# Patient Record
Sex: FEMALE | Race: WHITE | Employment: FULL TIME | URBAN - METROPOLITAN AREA
[De-identification: names, ages, dates, MRNs, and addresses within clinical notes are randomized per-mention and may not be internally consistent; named-entity substitution may affect disease eponyms.]

---

## 2022-04-20 ENCOUNTER — HOSPITAL ENCOUNTER (EMERGENCY)
Age: 27
Discharge: HOME | End: 2022-04-20
Attending: EMERGENCY MEDICINE
Payer: COMMERCIAL

## 2022-04-20 VITALS
RESPIRATION RATE: 20 BRPM | HEART RATE: 121 BPM | OXYGEN SATURATION: 99 % | TEMPERATURE: 97.6 F | SYSTOLIC BLOOD PRESSURE: 117 MMHG | DIASTOLIC BLOOD PRESSURE: 77 MMHG

## 2022-04-20 DIAGNOSIS — O21.0 MILD HYPEREMESIS GRAVIDARUM, ANTEPARTUM: Primary | ICD-10-CM

## 2022-04-20 DIAGNOSIS — E86.0 DEHYDRATION: ICD-10-CM

## 2022-04-20 LAB
ALBUMIN SERPL-MCNC: 3.9 G/DL (ref 3.5–5.2)
ALP BLD-CCNC: 90 U/L (ref 35–104)
ALT SERPL-CCNC: 20 U/L (ref 0–32)
ANION GAP SERPL CALCULATED.3IONS-SCNC: 15 MMOL/L (ref 7–16)
AST SERPL-CCNC: 15 U/L (ref 0–31)
BASOPHILS ABSOLUTE: 0.03 E9/L (ref 0–0.2)
BASOPHILS RELATIVE PERCENT: 0.3 % (ref 0–2)
BILIRUB SERPL-MCNC: 0.5 MG/DL (ref 0–1.2)
BUN BLDV-MCNC: 9 MG/DL (ref 6–20)
CALCIUM SERPL-MCNC: 9.1 MG/DL (ref 8.6–10.2)
CHLORIDE BLD-SCNC: 101 MMOL/L (ref 98–107)
CO2: 20 MMOL/L (ref 22–29)
CREAT SERPL-MCNC: 0.5 MG/DL (ref 0.5–1)
EOSINOPHILS ABSOLUTE: 0.1 E9/L (ref 0.05–0.5)
EOSINOPHILS RELATIVE PERCENT: 0.9 % (ref 0–6)
GFR AFRICAN AMERICAN: >60
GFR NON-AFRICAN AMERICAN: >60 ML/MIN/1.73
GLUCOSE BLD-MCNC: 90 MG/DL (ref 74–99)
HCT VFR BLD CALC: 40.1 % (ref 34–48)
HEMOGLOBIN: 13.5 G/DL (ref 11.5–15.5)
IMMATURE GRANULOCYTES #: 0.05 E9/L
IMMATURE GRANULOCYTES %: 0.5 % (ref 0–5)
LACTIC ACID: 1.2 MMOL/L (ref 0.5–2.2)
LIPASE: 17 U/L (ref 13–60)
LYMPHOCYTES ABSOLUTE: 1.7 E9/L (ref 1.5–4)
LYMPHOCYTES RELATIVE PERCENT: 15.3 % (ref 20–42)
MCH RBC QN AUTO: 30.8 PG (ref 26–35)
MCHC RBC AUTO-ENTMCNC: 33.7 % (ref 32–34.5)
MCV RBC AUTO: 91.6 FL (ref 80–99.9)
MONOCYTES ABSOLUTE: 0.59 E9/L (ref 0.1–0.95)
MONOCYTES RELATIVE PERCENT: 5.3 % (ref 2–12)
NEUTROPHILS ABSOLUTE: 8.61 E9/L (ref 1.8–7.3)
NEUTROPHILS RELATIVE PERCENT: 77.7 % (ref 43–80)
PDW BLD-RTO: 13.2 FL (ref 11.5–15)
PLATELET # BLD: 277 E9/L (ref 130–450)
PMV BLD AUTO: 10 FL (ref 7–12)
POTASSIUM REFLEX MAGNESIUM: 4 MMOL/L (ref 3.5–5)
RBC # BLD: 4.38 E12/L (ref 3.5–5.5)
SODIUM BLD-SCNC: 136 MMOL/L (ref 132–146)
TOTAL PROTEIN: 7.5 G/DL (ref 6.4–8.3)
WBC # BLD: 11.1 E9/L (ref 4.5–11.5)

## 2022-04-20 PROCEDURE — 99284 EMERGENCY DEPT VISIT MOD MDM: CPT

## 2022-04-20 PROCEDURE — 96374 THER/PROPH/DIAG INJ IV PUSH: CPT

## 2022-04-20 PROCEDURE — 6360000002 HC RX W HCPCS: Performed by: EMERGENCY MEDICINE

## 2022-04-20 PROCEDURE — 96361 HYDRATE IV INFUSION ADD-ON: CPT

## 2022-04-20 PROCEDURE — 85025 COMPLETE CBC W/AUTO DIFF WBC: CPT

## 2022-04-20 PROCEDURE — 2580000003 HC RX 258: Performed by: EMERGENCY MEDICINE

## 2022-04-20 PROCEDURE — 83605 ASSAY OF LACTIC ACID: CPT

## 2022-04-20 PROCEDURE — 80053 COMPREHEN METABOLIC PANEL: CPT

## 2022-04-20 PROCEDURE — 83690 ASSAY OF LIPASE: CPT

## 2022-04-20 RX ORDER — 0.9 % SODIUM CHLORIDE 0.9 %
1000 INTRAVENOUS SOLUTION INTRAVENOUS ONCE
Status: COMPLETED | OUTPATIENT
Start: 2022-04-20 | End: 2022-04-20

## 2022-04-20 RX ORDER — METOCLOPRAMIDE HYDROCHLORIDE 5 MG/ML
10 INJECTION INTRAMUSCULAR; INTRAVENOUS ONCE
Status: COMPLETED | OUTPATIENT
Start: 2022-04-20 | End: 2022-04-20

## 2022-04-20 RX ADMIN — SODIUM CHLORIDE 1000 ML: 9 INJECTION, SOLUTION INTRAVENOUS at 13:06

## 2022-04-20 RX ADMIN — METOCLOPRAMIDE 10 MG: 5 INJECTION, SOLUTION INTRAMUSCULAR; INTRAVENOUS at 13:07

## 2022-04-20 ASSESSMENT — LIFESTYLE VARIABLES: HOW OFTEN DO YOU HAVE A DRINK CONTAINING ALCOHOL: NEVER

## 2022-04-21 NOTE — ED PROVIDER NOTES
Chief Complaint   Patient presents with    Emesis     throughout 15 week pregnancy but worse for past 24 hours, 5lb weight loss       HPI  Filemon Betancur is a 27 y.o.  17 week pregnant female who presents from her OB's office with concerns for hyperemesis and dehydration. The complaints are persistent, moderate in severity, worsened by nothing and improved by nothing. The patient states she has had fairly severe nausea and vomiting almost since her pregnancy began. Over the last several days her vomiting has increased significantly. She estimates approximately 15 episodes of NBNB emesis in the last 24 hours and says she has lost approximately 5 lbs in the last weeks. She states that she has not taken anything for her symptoms at home, but says her OB gave her a prescription for Zofran today. The patient denies recent trauma, fever, chills, fatigue, HA, dizziness, lightheadedness, paresthesias, generalized weakness, confusion, forgetfulness, vision changes, eye redness, congestion, rhinorrhea, sore throat, neck pain, C/T/L pain, chest pain, palpitations, LE edema, SOB, cough, wheezing, abdominal pain, diarrhea, constipation, hematochezia, melena, dysuria, hematuria, flank pain, decreased UOP, myalgias, arthralgias, ROM issues, swelling, rashes and erythema. ROS is otherwise negative. The patient is currently taking no blood thinners. Tobacco Hx:   reports that she has been smoking. She has been smoking about 0.25 packs per day. She does not have any smokeless tobacco history on file. Alcohol Hx:   has no history on file for alcohol use. Illicit Drug Hx:   has no history on file for drug use. The history is provided by the patient. Last Tetanus (if applicable): n/a    Review of Systems:   A complete review of systems was performed and pertinent positives and negatives are stated within the HPI, all other systems reviewed and are negative.     Physical Exam:  GEN: Cooperative, well-developed, well-nourished adult in NAD; pt appears stated age  Head: Normocephalic and atraumatic; no tenderness to palpation anywhere  Eyes: PERRL, EOMI, conjunctiva clear, cornea without gross abnormality, no visual deficits noted b/l  Ears: External ear normal-appearing and non-tender b/l; no hearing deficit noted  Nose: Nares patent and free of debris; septum midline; mucosa appears normal; no drainage noted  Throat: Oral mucosa dry with no lesions noted; dentition wnl; no posterior pharyngeal erythema or edema; tonsils are not swollen and there is no exudate noted; no post-nasal drip  Neck: Supple and symmetrical with midline trachea; no cervical or supraclavicular lymphadenopathy noted; thyroid not palpated, but no tenderness or masses noted in the region; normal ROM with no meningeal signs  Lungs: LCTAB with no wheezes, rhonchi or rales noted; no respiratory distress, breathing unlabored   CV: RRR, no murmurs, gallops or rubs noted on auscultation, normal S1/S2; UE and LE distal pulses intact b/l +2/4 with no cyanosis noted; no LE edema noted b/l; capillary refill 2-3 seconds  ABD: Soft, non-tender, gravid, no organomegaly, hernias or masses noted  /Rectal: no suprapubic tenderness; remainder of exam deferred  MSK: UEs and LEs without notable trauma, ROM restriction or tenderness to palpation b/l; normal strength throughout  Skin: Skin warm and dry without notable rash, erythema, bruising or lesion; normal turgor  Neuro: A&O x3; CN II-XII appear grossly intact; no focal deficits appreciated; pt thoughtful in discussion and able to answer all questions without issue  Psych: normal attention with no obvious AVH, normal mood, normal affect    ---------------------------------- PAST HISTORY ---------------------------------------------  Past Medical History:  has no past medical history on file. Past Surgical History:  has no past surgical history on file. Social History:  reports that she has been smoking.  She has been smoking about 0.25 packs per day. She does not have any smokeless tobacco history on file. Family History: family history is not on file. Home Meds: Not in a hospital admission. The patients home medications have been reviewed. Allergies: Patient has no known allergies. ------------------------- NURSING NOTES AND VITALS REVIEWED ---------------------------  Date / Time Roomed:  4/20/2022 12:09 PM  ED Bed Assignment:  SEAN/SEAN    The nursing notes within the ED encounter and vital signs as below have been reviewed. /77   Pulse 121   Temp 97.6 °F (36.4 °C) (Infrared)   Resp 20   SpO2 99%   -------------------------------------------------- RESULTS / INTERVENTIONS -------------------------------------------------  All laboratory and radiology tests have been reviewed by this physician.     LABS:  Results for orders placed or performed during the hospital encounter of 04/20/22   CBC with Auto Differential   Result Value Ref Range    WBC 11.1 4.5 - 11.5 E9/L    RBC 4.38 3.50 - 5.50 E12/L    Hemoglobin 13.5 11.5 - 15.5 g/dL    Hematocrit 40.1 34.0 - 48.0 %    MCV 91.6 80.0 - 99.9 fL    MCH 30.8 26.0 - 35.0 pg    MCHC 33.7 32.0 - 34.5 %    RDW 13.2 11.5 - 15.0 fL    Platelets 457 317 - 491 E9/L    MPV 10.0 7.0 - 12.0 fL    Neutrophils % 77.7 43.0 - 80.0 %    Immature Granulocytes % 0.5 0.0 - 5.0 %    Lymphocytes % 15.3 (L) 20.0 - 42.0 %    Monocytes % 5.3 2.0 - 12.0 %    Eosinophils % 0.9 0.0 - 6.0 %    Basophils % 0.3 0.0 - 2.0 %    Neutrophils Absolute 8.61 (H) 1.80 - 7.30 E9/L    Immature Granulocytes # 0.05 E9/L    Lymphocytes Absolute 1.70 1.50 - 4.00 E9/L    Monocytes Absolute 0.59 0.10 - 0.95 E9/L    Eosinophils Absolute 0.10 0.05 - 0.50 E9/L    Basophils Absolute 0.03 0.00 - 0.20 E9/L   Comprehensive Metabolic Panel w/ Reflex to MG   Result Value Ref Range    Sodium 136 132 - 146 mmol/L    Potassium reflex Magnesium 4.0 3.5 - 5.0 mmol/L    Chloride 101 98 - 107 mmol/L    CO2 20 (L) 22 - 29 mmol/L    Anion Gap 15 7 - 16 mmol/L    Glucose 90 74 - 99 mg/dL    BUN 9 6 - 20 mg/dL    CREATININE 0.5 0.5 - 1.0 mg/dL    GFR Non-African American >60 >=60 mL/min/1.73    GFR African American >60     Calcium 9.1 8.6 - 10.2 mg/dL    Total Protein 7.5 6.4 - 8.3 g/dL    Albumin 3.9 3.5 - 5.2 g/dL    Total Bilirubin 0.5 0.0 - 1.2 mg/dL    Alkaline Phosphatase 90 35 - 104 U/L    ALT 20 0 - 32 U/L    AST 15 0 - 31 U/L   Lipase   Result Value Ref Range    Lipase 17 13 - 60 U/L   Lactic Acid   Result Value Ref Range    Lactic Acid 1.2 0.5 - 2.2 mmol/L       RADIOLOGY: Interpreted by Radiologist unless otherwise noted. No orders to display       Oxygen Saturation Interpretation: normal    Meds Given:  Medications   0.9 % sodium chloride bolus (0 mLs IntraVENous Stopped 4/20/22 1559)   metoclopramide (REGLAN) injection 10 mg (10 mg IntraVENous Given 4/20/22 1307)       Procedures:  No procedures performed. --------------------------------- PROGRESS NOTES / ADDITIONAL PROVIDER NOTES ---------------------------------  Consultations:  As outlined below. ED Course:  ED Course as of 04/23/22 1001   Wed Apr 20, 2022   1227 ATTENDING PROVIDER ATTESTATION:     I have personally performed and/or participated in the history, exam, medical decision making, and procedures and agree with all pertinent clinical information unless otherwise noted. I have also reviewed and agree with the past medical, family and social history unless otherwise noted. I have discussed this patient in detail with the resident, and provided the instruction and education regarding here stating she is about 15 weeks pregnant and is having several days of persistent nausea and vomiting with no diarrhea. No flank pain. No fevers. No actual abdominal pain. No particular dysuria. My findings/plan: Patient sitting the bed resting comfortably no distress. Heart rate mildly tachycardic. Dry lips and oral mucosa.   Abdomen soft and nontender with no CVA tenderness. No jaundice or icterus. Arms legs are neurovascular intact. No pretibial edema or calf pain. [NC]   2935 On reevaluation, the patient is resting comfortably in bed. She states that she is feeling significantly better following the dose of Reglan. Her IV fluid is approximately shelter in. I told her she is still feeling well after she completes the liter, we will discharge her home if that is what she prefers. She states that that is what she is hoping for. We will continue to monitor. [ML]   6018 Case discussed with Dr. Joe Robles on patient arrival, she knows this patient well and well centered initially for possible admission, patient able to be seen and hydrated in ER, she like the patient evaluated, hydrated and see how she does clinically and then called back. [NC]   1228 Patient laying the bed resting comfortably in no distress. She states she is feeling much better. She has had no vomiting in the ER. Her exam is unchanged. She is finishing up a liter of fluid and states she is ready to go home. Electrolytes and work-up results have been discussed with her. [NC]   1420 Case discussed with Dr. Joe Robles, detailed overview given, she will follow the patient on an outpatient basis. [NC]   8673 Patient had a urinalysis ordered, patient has received IV fluids and has had no vomiting in the ER however she does not feel she has to pee right now and is not going to wait to give us a sample and states she is ready to leave and is also refusing a straight cath. [NC]      ED Course User Index  [ML] Irasema Parker DO  [NC] 1150 [facility], DO       8196: All results were discussed with the patient and I have provided specific details regarding the plan of care, diagnosis and associated prognosis.  The patient tolerated the visit well and, at the time of discharge, was without objective evidence of hemodynamic instability or an acute process requiring hospitalization and inpatient management. The patient was seen by myself and the assigned attending physician, Dr. Jannie Serrano, who agreed with my assessment and plan as laid out herein. The importance of follow-up was discussed at the end of the visit and I recommended that the patient be seen by their primary care physician in 2-3 days. The patient verbalized their understanding and agreement with the plan as presented and stated their intention to follow up. Reasons to return to the ER or seek immediate evaluation by a medical provider were discussed at length and all questions were answered to the highest degree of accuracy possible based on the current information available. At this time the patient is stable and safe for discharge. MDM:  Patient presented with concerns for hyperemesis and dehydration. On arrival, the patient was tachycardic with remaining VS wnl. physical exam was as documented above. Labs were remarkable for no leukocytosis, normal lactate, normal lipase and no electrolyte abnormalities. She was unable to provide a urine sample and refused catheterization. She was made aware of the importance of the test, but said she would not be able to provide one and wanted to forgo it. She was given a dose of reglan and a bolus of NSIVF and reported significant symptomatic improvement. Following that, she repeatedly stated her desire to go home, though on arrival she said she was sent in by her OB to be admitted and was initially fine with that plan. Given that, her clinical improvement and the lack of findings necessitating further emergent intervention or hospitalization, the decision was made to discharge the patient with recommendations for follow up and symptomatic care. Reasons to return to the ER were discussed and all questions were answered. The patient was stable at the time of their disposition. Discharge Medication List as of 4/20/2022  4:13 PM          Diagnosis:  1.  Mild hyperemesis gravidarum, antepartum 2. Dehydration        Disposition:  Patient's disposition: Discharge to home. Patient's condition is stable. This patient was seen, examined and treated with Dr. Claudia Almonte. All pertinent aspects of the patient's care were discussed with the attending physician.        Mary Simon DO  Resident  04/23/22 1685

## 2022-07-01 ENCOUNTER — HOSPITAL ENCOUNTER (OUTPATIENT)
Age: 27
Discharge: HOME | End: 2022-07-01
Attending: LEGAL MEDICINE | Admitting: LEGAL MEDICINE
Payer: COMMERCIAL

## 2022-07-01 PROCEDURE — 6360000002 HC RX W HCPCS: Performed by: LEGAL MEDICINE

## 2022-07-01 PROCEDURE — 96372 THER/PROPH/DIAG INJ SC/IM: CPT

## 2022-07-01 RX ORDER — BETAMETHASONE SODIUM PHOSPHATE AND BETAMETHASONE ACETATE 3; 3 MG/ML; MG/ML
12 INJECTION, SUSPENSION INTRA-ARTICULAR; INTRALESIONAL; INTRAMUSCULAR; SOFT TISSUE ONCE
Status: COMPLETED | OUTPATIENT
Start: 2022-07-01 | End: 2022-07-01

## 2022-07-01 RX ADMIN — BETAMETHASONE SODIUM PHOSPHATE AND BETAMETHASONE ACETATE 12 MG: 3; 3 INJECTION, SUSPENSION INTRA-ARTICULAR; INTRALESIONAL; INTRAMUSCULAR at 13:43

## 2022-07-01 NOTE — PROGRESS NOTES
Injection completed and patient watched prior to discharge.   Patient discharged ambulatory with no complaints

## 2022-07-02 ENCOUNTER — HOSPITAL ENCOUNTER (OUTPATIENT)
Age: 27
Discharge: HOME | End: 2022-07-02
Attending: LEGAL MEDICINE | Admitting: LEGAL MEDICINE
Payer: COMMERCIAL

## 2022-07-02 ENCOUNTER — APPOINTMENT (OUTPATIENT)
Dept: LABOR AND DELIVERY | Age: 27
End: 2022-07-02
Payer: COMMERCIAL

## 2022-07-02 VITALS
SYSTOLIC BLOOD PRESSURE: 112 MMHG | HEART RATE: 103 BPM | DIASTOLIC BLOOD PRESSURE: 70 MMHG | TEMPERATURE: 97.9 F | RESPIRATION RATE: 14 BRPM

## 2022-07-02 PROCEDURE — 6360000002 HC RX W HCPCS: Performed by: LEGAL MEDICINE

## 2022-07-02 PROCEDURE — 59025 FETAL NON-STRESS TEST: CPT

## 2022-07-02 RX ORDER — BETAMETHASONE SODIUM PHOSPHATE AND BETAMETHASONE ACETATE 3; 3 MG/ML; MG/ML
12.5 INJECTION, SUSPENSION INTRA-ARTICULAR; INTRALESIONAL; INTRAMUSCULAR; SOFT TISSUE ONCE
Status: COMPLETED | OUTPATIENT
Start: 2022-07-02 | End: 2022-07-02

## 2022-07-02 RX ADMIN — BETAMETHASONE SODIUM PHOSPHATE AND BETAMETHASONE ACETATE 12.5 MG: 3; 3 INJECTION, SUSPENSION INTRA-ARTICULAR; INTRALESIONAL; INTRAMUSCULAR at 13:38

## 2022-07-02 NOTE — PROGRESS NOTES
Patient discharged in stable condition.  NST tracing faxed to Dr. Yeny Arango office per her request.

## 2022-07-02 NOTE — PROGRESS NOTES
26w1d  EDC 10/7/22 ambulatory to L&D for NST and second dose of betamethasone. EFM applied. Maternal perception of fetal movement noted. Patient denies any bleeding or leaking of fluid. Does report occasional abdominal discomfort that has been present for a few days now. Call light in reach.

## 2022-07-02 NOTE — PROGRESS NOTES
Dr. Uzair Mejia notified of reactive NST, blood pressure reading, and administration of second dose of betamethasone. Aware patient currently offers no complaints. Call transferred to patient's room for Dr. Uzair Mejia to speak with patient. Patient may be discharged per Dr. Uzair Mejia.

## 2022-08-04 ENCOUNTER — HOSPITAL ENCOUNTER (OUTPATIENT)
Age: 27
Discharge: HOME | End: 2022-08-04
Attending: LEGAL MEDICINE | Admitting: LEGAL MEDICINE
Payer: COMMERCIAL

## 2022-08-04 VITALS
TEMPERATURE: 98.1 F | DIASTOLIC BLOOD PRESSURE: 69 MMHG | RESPIRATION RATE: 16 BRPM | HEART RATE: 100 BPM | SYSTOLIC BLOOD PRESSURE: 110 MMHG

## 2022-08-04 PROBLEM — Z34.93 NORMAL PREGNANCY, THIRD TRIMESTER: Status: ACTIVE | Noted: 2022-08-04

## 2022-08-04 PROCEDURE — 99202 OFFICE O/P NEW SF 15 MIN: CPT

## 2022-08-04 NOTE — PROGRESS NOTES
Dr. Alfonzo Matt called in and FFN was negative. Patient may be discharged to home. No co's offered. Home instructions given, patient verbalizes understanding. Discharged ambulatory to unit door.

## 2022-08-04 NOTE — DISCHARGE INSTRUCTIONS
Home Undelivered Discharge Instructions    After Discharge Orders: Follow up as scheduled                  Diet:  normal diet as tolerated    Rest: normal activity as tolerated    Other instructions: Do kick counts once a day on your baby. Choose the time of day your baby is most active. Get in a comfortable lying or sitting position and time how long it takes to feel 10 kicks, twists, turns, swishes, or rolls.  Call your physician or midwife if there have not been 10 kicks in  hours    Call physician or midwife, return to Labor and Delivery, call [phone], or go to the nearest Emergency Room if: increased leakage or fluid, contractions more than  6 per  1 hour, decreased fetal movement, persistent low back pain or cramping, bleeding from vaginal area, difficulty urinating, pain with urination, difficulty breathing, new calf pain, persistent headache, or vision change

## 2022-08-04 NOTE — PROGRESS NOTES
Patient 30w6d  presents to unit from physician office for evaluation of softening cervix. Patient co sharp pain lower sacral area that started this morning. Denies bleeding or leaking fluid or contractions. States fetal movement has been less today. But is perceiving fetal movement. Denies pain. Plan of care reviewed.

## 2022-08-04 NOTE — PROGRESS NOTES
Notified Dr. Nancy Carvajal of patient arrival, reviewed fetal tracing and vital signs. Patient comfortable. Will await results of FFN done in the office then will evaluate.

## 2022-08-05 NOTE — DISCHARGE SUMMARY
[address]                               DISCHARGE SUMMARY    PATIENT NAME: Ness Hughes                      :        1995  MED REC NO:   [id number]                            ROOM:       LD04  ACCOUNT NO:   [de-identified]                           ADMIT DATE: 2022. .. PROVIDER:     Lala Rainey MD                  DISCHARGE DATE:  2022    ADMITTING DIAGNOSIS:  Intrauterine pregnancy at 30-1/2 weeks with  threatened  labor. DISCHARGE DIAGNOSIS:  No evidence of maternal or fetal compromise. HOSPITAL COURSE IN DETAIL:  The patient's  fetal heart rate  tracing remained reactive. No contractions were noted. Fetal  fibronectin came back negative. She had no further complaints and felt  ready to go home. Her back pain had improved. She was therefore  discharged home with  labor and fetal movement precautions and  instructions to follow up at the office next week. She indicates  understanding of all instructions.         Naeem Brian MD    D: 2022 19:07:50       T: 2022 19:10:12     [email or web address]  Job#: [id number]     Doc#: [id number]    CC:

## 2022-08-05 NOTE — H&P
[address]                              HISTORY AND PHYSICAL    PATIENT NAME: Michelle Alejandro                      :        1995  MED REC NO:   [id number]                            ROOM:       LD04  ACCOUNT NO:   [de-identified]                           ADMIT DATE: 2022. .. PROVIDER:     Travis Velez MD    HISTORY OF PRESENT ILLNESS:  The patient is a 20-year-old white female  who presented on 2022 with complaints of low back pain. She also  was complaining of some sharp lower abdominal pain. No leaking fluid or  vaginal bleeding. No change in fetal movements. No change in her bowel  or urinary habits. She has a history of cervical funneling and had been  monitored closely for development of  labor. In the second trimester  she began to complain of pelvic pressure and back pain, and cervical funneling and shortening was noted on ultrasound. As the fetal fibronectin was still pending at time of that complaint, she did receive a course of  steroids due to concern regarding development of  labor. The fetal fibronectin then came back negative. Since  then, her symptomatology has subsided until today. For her past medical, surgical, GYN, social, family history, please  refer to ACOG forms A and B. REVIEW OF SYSTEMS:  Negative for cardiac, respiratory, GI, ,  neurologic, psychiatric, ENT, integument, hematologic, lymphatic,  constitutional abnormalities. PHYSICAL EXAMINATION:  VITAL SIGNS:  Stable. HEENT:  Negative. LUNGS:  Clear to auscultation. CV:  Regular rate and rhythm. ABDOMEN:  Gravid, soft, nontender. Fetal heart tones are present in the  140s with [id number] variability present. No contractions noted. Cervix is closed, soft and mid position. EXTREMITIES:  No clubbing, cyanosis. 2+ edema. No cords or erythema.     ASSESSMENT:  Intrauterine pregnancy at 30 weeks and 6 days with  threatened  labor. PLAN:  Fetal fibronectin has been performed. We will continue to  monitor. Further management based on clinical findings.         John Cyr MD    D: 2022 19:06:38       T: 2022 19:09:06     [email or web address]  Job#: [id number]     Doc#: [id number]    CC:

## 2022-08-16 ENCOUNTER — HOSPITAL ENCOUNTER (OUTPATIENT)
Age: 27
Discharge: HOME | DRG: 833 | End: 2022-08-16
Attending: LEGAL MEDICINE | Admitting: LEGAL MEDICINE
Payer: COMMERCIAL

## 2022-08-16 PROBLEM — Z71.89 INJECTION EDUCATION, ENCOUNTER FOR: Status: ACTIVE | Noted: 2022-08-16

## 2022-08-16 PROCEDURE — 96372 THER/PROPH/DIAG INJ SC/IM: CPT

## 2022-08-16 PROCEDURE — 6360000002 HC RX W HCPCS

## 2022-08-16 RX ORDER — BETAMETHASONE SODIUM PHOSPHATE AND BETAMETHASONE ACETATE 3; 3 MG/ML; MG/ML
12 INJECTION, SUSPENSION INTRA-ARTICULAR; INTRALESIONAL; INTRAMUSCULAR; SOFT TISSUE ONCE
Status: COMPLETED | OUTPATIENT
Start: 2022-08-16 | End: 2022-08-16

## 2022-08-16 RX ORDER — BETAMETHASONE SODIUM PHOSPHATE AND BETAMETHASONE ACETATE 3; 3 MG/ML; MG/ML
INJECTION, SUSPENSION INTRA-ARTICULAR; INTRALESIONAL; INTRAMUSCULAR; SOFT TISSUE
Status: COMPLETED
Start: 2022-08-16 | End: 2022-08-16

## 2022-08-16 RX ADMIN — BETAMETHASONE SODIUM PHOSPHATE AND BETAMETHASONE ACETATE 12 MG: 3; 3 INJECTION, SUSPENSION INTRA-ARTICULAR; INTRALESIONAL; INTRAMUSCULAR at 16:40

## 2022-08-16 RX ADMIN — BETAMETHASONE SODIUM PHOSPHATE AND BETAMETHASONE ACETATE 12 MG: 3; 3 INJECTION, SUSPENSION INTRA-ARTICULAR; INTRALESIONAL; INTRAMUSCULAR; SOFT TISSUE at 16:40

## 2022-08-16 NOTE — PROGRESS NOTES
Patient to unit for betamethasone injection. No co's offered.  Injection given and patient discharged ambulatory

## 2022-08-17 ENCOUNTER — HOSPITAL ENCOUNTER (INPATIENT)
Age: 27
LOS: 2 days | Discharge: HOME | DRG: 833 | End: 2022-08-19
Attending: LEGAL MEDICINE | Admitting: LEGAL MEDICINE
Payer: COMMERCIAL

## 2022-08-17 PROBLEM — O26.93 COMPLICATION OF PREGNANCY IN THIRD TRIMESTER: Status: ACTIVE | Noted: 2022-08-17

## 2022-08-17 LAB
ABO/RH: NORMAL
ALBUMIN SERPL-MCNC: 3.7 G/DL (ref 3.5–5.2)
ALP BLD-CCNC: 168 U/L (ref 35–104)
ALT SERPL-CCNC: 11 U/L (ref 0–32)
AMPHETAMINE SCREEN, URINE: NOT DETECTED
ANION GAP SERPL CALCULATED.3IONS-SCNC: 15 MMOL/L (ref 7–16)
ANTIBODY SCREEN: NORMAL
AST SERPL-CCNC: 11 U/L (ref 0–31)
BARBITURATE SCREEN URINE: NOT DETECTED
BENZODIAZEPINE SCREEN, URINE: NOT DETECTED
BILIRUB SERPL-MCNC: 0.6 MG/DL (ref 0–1.2)
BILIRUBIN URINE: NEGATIVE
BLOOD, URINE: NEGATIVE
BUN BLDV-MCNC: 5 MG/DL (ref 6–20)
CALCIUM SERPL-MCNC: 9.6 MG/DL (ref 8.6–10.2)
CANNABINOID SCREEN URINE: NOT DETECTED
CHLORIDE BLD-SCNC: 99 MMOL/L (ref 98–107)
CLARITY: CLEAR
CO2: 19 MMOL/L (ref 22–29)
COCAINE METABOLITE SCREEN URINE: NOT DETECTED
COLOR: YELLOW
CREAT SERPL-MCNC: 0.4 MG/DL (ref 0.5–1)
FENTANYL SCREEN, URINE: NOT DETECTED
GFR AFRICAN AMERICAN: >60
GFR NON-AFRICAN AMERICAN: >60 ML/MIN/1.73
GLUCOSE BLD-MCNC: 94 MG/DL (ref 74–99)
GLUCOSE URINE: NEGATIVE MG/DL
HCT VFR BLD CALC: 36.3 % (ref 34–48)
HEMOGLOBIN: 12.6 G/DL (ref 11.5–15.5)
KETONES, URINE: 40 MG/DL
LEUKOCYTE ESTERASE, URINE: NEGATIVE
Lab: NORMAL
MAGNESIUM: 4.7 MG/DL (ref 1.6–2.6)
MCH RBC QN AUTO: 31.7 PG (ref 26–35)
MCHC RBC AUTO-ENTMCNC: 34.7 % (ref 32–34.5)
MCV RBC AUTO: 91.4 FL (ref 80–99.9)
METHADONE SCREEN, URINE: NOT DETECTED
NITRITE, URINE: NEGATIVE
OPIATE SCREEN URINE: NOT DETECTED
OXYCODONE URINE: NOT DETECTED
PDW BLD-RTO: 13.4 FL (ref 11.5–15)
PH UA: 6 (ref 5–9)
PHENCYCLIDINE SCREEN URINE: NOT DETECTED
PLATELET # BLD: 252 E9/L (ref 130–450)
PMV BLD AUTO: 10.6 FL (ref 7–12)
POTASSIUM SERPL-SCNC: 3.6 MMOL/L (ref 3.5–5)
PROTEIN UA: NEGATIVE MG/DL
RBC # BLD: 3.97 E12/L (ref 3.5–5.5)
SODIUM BLD-SCNC: 133 MMOL/L (ref 132–146)
SPECIFIC GRAVITY UA: 1.01 (ref 1–1.03)
TOTAL PROTEIN: 7 G/DL (ref 6.4–8.3)
UROBILINOGEN, URINE: 0.2 E.U./DL
WBC # BLD: 16.5 E9/L (ref 4.5–11.5)

## 2022-08-17 PROCEDURE — 96361 HYDRATE IV INFUSION ADD-ON: CPT

## 2022-08-17 PROCEDURE — 96372 THER/PROPH/DIAG INJ SC/IM: CPT

## 2022-08-17 PROCEDURE — 6360000002 HC RX W HCPCS: Performed by: LEGAL MEDICINE

## 2022-08-17 PROCEDURE — 86900 BLOOD TYPING SEROLOGIC ABO: CPT

## 2022-08-17 PROCEDURE — 96367 TX/PROPH/DG ADDL SEQ IV INF: CPT

## 2022-08-17 PROCEDURE — 1200000000 HC SEMI PRIVATE

## 2022-08-17 PROCEDURE — 86901 BLOOD TYPING SEROLOGIC RH(D): CPT

## 2022-08-17 PROCEDURE — 83735 ASSAY OF MAGNESIUM: CPT

## 2022-08-17 PROCEDURE — G0378 HOSPITAL OBSERVATION PER HR: HCPCS

## 2022-08-17 PROCEDURE — 96366 THER/PROPH/DIAG IV INF ADDON: CPT

## 2022-08-17 PROCEDURE — 96365 THER/PROPH/DIAG IV INF INIT: CPT

## 2022-08-17 PROCEDURE — 86850 RBC ANTIBODY SCREEN: CPT

## 2022-08-17 PROCEDURE — 81003 URINALYSIS AUTO W/O SCOPE: CPT

## 2022-08-17 PROCEDURE — 80307 DRUG TEST PRSMV CHEM ANLYZR: CPT

## 2022-08-17 PROCEDURE — 96360 HYDRATION IV INFUSION INIT: CPT

## 2022-08-17 PROCEDURE — 2580000003 HC RX 258: Performed by: LEGAL MEDICINE

## 2022-08-17 PROCEDURE — 36415 COLL VENOUS BLD VENIPUNCTURE: CPT

## 2022-08-17 PROCEDURE — 85027 COMPLETE CBC AUTOMATED: CPT

## 2022-08-17 PROCEDURE — 80053 COMPREHEN METABOLIC PANEL: CPT

## 2022-08-17 RX ORDER — BETAMETHASONE SODIUM PHOSPHATE AND BETAMETHASONE ACETATE 3; 3 MG/ML; MG/ML
12 INJECTION, SUSPENSION INTRA-ARTICULAR; INTRALESIONAL; INTRAMUSCULAR; SOFT TISSUE ONCE
Status: COMPLETED | OUTPATIENT
Start: 2022-08-17 | End: 2022-08-17

## 2022-08-17 RX ORDER — PENICILLIN G 3000000 [IU]/50ML
3 INJECTION, SOLUTION INTRAVENOUS EVERY 4 HOURS
Status: DISPENSED | OUTPATIENT
Start: 2022-08-17 | End: 2022-08-19

## 2022-08-17 RX ORDER — SODIUM CHLORIDE, SODIUM LACTATE, POTASSIUM CHLORIDE, CALCIUM CHLORIDE 600; 310; 30; 20 MG/100ML; MG/100ML; MG/100ML; MG/100ML
INJECTION, SOLUTION INTRAVENOUS CONTINUOUS
Status: DISCONTINUED | OUTPATIENT
Start: 2022-08-17 | End: 2022-08-19 | Stop reason: HOSPADM

## 2022-08-17 RX ORDER — MAGNESIUM SULFATE IN WATER 40 MG/ML
4000 INJECTION, SOLUTION INTRAVENOUS ONCE
Status: COMPLETED | OUTPATIENT
Start: 2022-08-17 | End: 2022-08-17

## 2022-08-17 RX ADMIN — PENICILLIN G 3 MILLION UNITS: 3000000 INJECTION, SOLUTION INTRAVENOUS at 16:04

## 2022-08-17 RX ADMIN — PENICILLIN G 3 MILLION UNITS: 3000000 INJECTION, SOLUTION INTRAVENOUS at 19:48

## 2022-08-17 RX ADMIN — BETAMETHASONE SODIUM PHOSPHATE AND BETAMETHASONE ACETATE 12 MG: 3; 3 INJECTION, SUSPENSION INTRA-ARTICULAR; INTRALESIONAL; INTRAMUSCULAR at 16:06

## 2022-08-17 RX ADMIN — MAGNESIUM SULFATE HEPTAHYDRATE 2000 MG/HR: 40 INJECTION, SOLUTION INTRAVENOUS at 14:25

## 2022-08-17 RX ADMIN — DEXTROSE MONOHYDRATE 5 MILLION UNITS: 5 INJECTION INTRAVENOUS at 12:06

## 2022-08-17 RX ADMIN — MAGNESIUM SULFATE HEPTAHYDRATE 4000 MG: 40 INJECTION, SOLUTION INTRAVENOUS at 13:47

## 2022-08-17 RX ADMIN — PENICILLIN G 3 MILLION UNITS: 3000000 INJECTION, SOLUTION INTRAVENOUS at 23:34

## 2022-08-17 RX ADMIN — MAGNESIUM SULFATE HEPTAHYDRATE 2000 MG/HR: 40 INJECTION, SOLUTION INTRAVENOUS at 23:45

## 2022-08-17 NOTE — PROGRESS NOTES
32w5d    Patient sent in from Dr Didi Davis office for monitoring. Patient denies leaking of fluids or bleeding and perceives fetal movement. Placed on EFM. Oriented to [address]. Call light in reach.

## 2022-08-17 NOTE — PROGRESS NOTES
Magnesium sulfate started per order. Plan of care reviewed with patient, questions answered.  at bedside and supportive.

## 2022-08-18 ENCOUNTER — APPOINTMENT (OUTPATIENT)
Dept: ULTRASOUND IMAGING | Age: 27
DRG: 833 | End: 2022-08-18
Payer: COMMERCIAL

## 2022-08-18 PROBLEM — Z3A.32 32 WEEKS GESTATION OF PREGNANCY: Status: ACTIVE | Noted: 2022-08-18

## 2022-08-18 LAB
MAGNESIUM: 5.3 MG/DL (ref 1.6–2.6)
MAGNESIUM: 5.9 MG/DL (ref 1.6–2.6)
MAGNESIUM: 5.9 MG/DL (ref 1.6–2.6)

## 2022-08-18 PROCEDURE — 96367 TX/PROPH/DG ADDL SEQ IV INF: CPT

## 2022-08-18 PROCEDURE — 96376 TX/PRO/DX INJ SAME DRUG ADON: CPT

## 2022-08-18 PROCEDURE — 6360000002 HC RX W HCPCS: Performed by: LEGAL MEDICINE

## 2022-08-18 PROCEDURE — G0378 HOSPITAL OBSERVATION PER HR: HCPCS

## 2022-08-18 PROCEDURE — 36415 COLL VENOUS BLD VENIPUNCTURE: CPT

## 2022-08-18 PROCEDURE — 83735 ASSAY OF MAGNESIUM: CPT

## 2022-08-18 PROCEDURE — 1220000001 HC SEMI PRIVATE L&D R&B

## 2022-08-18 PROCEDURE — 76815 OB US LIMITED FETUS(S): CPT

## 2022-08-18 PROCEDURE — 96366 THER/PROPH/DIAG IV INF ADDON: CPT

## 2022-08-18 PROCEDURE — 2580000003 HC RX 258: Performed by: LEGAL MEDICINE

## 2022-08-18 PROCEDURE — 6370000000 HC RX 637 (ALT 250 FOR IP): Performed by: LEGAL MEDICINE

## 2022-08-18 PROCEDURE — 96361 HYDRATE IV INFUSION ADD-ON: CPT

## 2022-08-18 RX ORDER — BETAMETHASONE SODIUM PHOSPHATE AND BETAMETHASONE ACETATE 3; 3 MG/ML; MG/ML
12 INJECTION, SUSPENSION INTRA-ARTICULAR; INTRALESIONAL; INTRAMUSCULAR; SOFT TISSUE ONCE
Status: DISCONTINUED | OUTPATIENT
Start: 2022-08-18 | End: 2022-08-18

## 2022-08-18 RX ORDER — FAMOTIDINE 20 MG/1
20 TABLET, FILM COATED ORAL 2 TIMES DAILY
Status: DISCONTINUED | OUTPATIENT
Start: 2022-08-18 | End: 2022-08-19 | Stop reason: HOSPADM

## 2022-08-18 RX ORDER — CALCIUM CARBONATE 200(500)MG
500 TABLET,CHEWABLE ORAL 3 TIMES DAILY PRN
Status: DISCONTINUED | OUTPATIENT
Start: 2022-08-18 | End: 2022-08-19 | Stop reason: HOSPADM

## 2022-08-18 RX ADMIN — PENICILLIN G 3 MILLION UNITS: 3000000 INJECTION, SOLUTION INTRAVENOUS at 23:49

## 2022-08-18 RX ADMIN — FAMOTIDINE 20 MG: 20 TABLET ORAL at 22:43

## 2022-08-18 RX ADMIN — PENICILLIN G 3 MILLION UNITS: 3000000 INJECTION, SOLUTION INTRAVENOUS at 04:06

## 2022-08-18 RX ADMIN — PENICILLIN G 3 MILLION UNITS: 3000000 INJECTION, SOLUTION INTRAVENOUS at 16:09

## 2022-08-18 RX ADMIN — PENICILLIN G 3 MILLION UNITS: 3000000 INJECTION, SOLUTION INTRAVENOUS at 20:07

## 2022-08-18 RX ADMIN — PENICILLIN G 3 MILLION UNITS: 3000000 INJECTION, SOLUTION INTRAVENOUS at 09:00

## 2022-08-18 RX ADMIN — PENICILLIN G 3 MILLION UNITS: 3000000 INJECTION, SOLUTION INTRAVENOUS at 12:20

## 2022-08-18 RX ADMIN — CALCIUM CARBONATE (ANTACID) CHEW TAB 500 MG 500 MG: 500 CHEW TAB at 18:29

## 2022-08-18 RX ADMIN — MAGNESIUM SULFATE HEPTAHYDRATE 2000 MG/HR: 40 INJECTION, SOLUTION INTRAVENOUS at 09:00

## 2022-08-18 RX ADMIN — SODIUM CHLORIDE, POTASSIUM CHLORIDE, SODIUM LACTATE AND CALCIUM CHLORIDE: 600; 310; 30; 20 INJECTION, SOLUTION INTRAVENOUS at 23:35

## 2022-08-18 NOTE — PLAN OF CARE
Problem: Pain  Goal: Verbalizes/displays adequate comfort level or baseline comfort level  Outcome: Progressing     Problem: Infection - Adult  Goal: Absence of infection at discharge  Outcome: Progressing  Goal: Absence of infection during hospitalization  Outcome: Progressing  Goal: Absence of fever/infection during anticipated neutropenic period  Outcome: Progressing     Problem: Safety - Adult  Goal: Free from fall injury  Outcome: Progressing     Problem: Discharge Planning  Goal: Discharge to home or other facility with appropriate resources  Outcome: Progressing     Problem: Chronic Conditions and Co-morbidities  Goal: Patient's chronic conditions and co-morbidity symptoms are monitored and maintained or improved  Outcome: Progressing independent

## 2022-08-18 NOTE — H&P
[address]                              HISTORY AND PHYSICAL    PATIENT NAME: Ponce Trimble                      :        1995  MED REC NO:   [id number]                            ROOM:       LD03  ACCOUNT NO:   [de-identified]                           ADMIT DATE: 2022. .. PROVIDER:     Toma Fabian MD    HISTORY OF PRESENT ILLNESS:  A 76-year-old white female has had some  complaints of low back pain on and off. She had a normal cervical length on transvaginal ultrasound at approximately 18 weeks gestation. Starting in the mid second  trimester, she began to complain of pelvic pressure. At that time, note  was made of some cervical funneling and shortening on the ultrasound. This appeared to occur intermittently. Progesterone suppositories were initiated. Two fetal fibronectins have been  performed, once mid second trimester and another that was performed at 30 and 1/2 weeks  and both were negative. Expectant management ensued, although she did  receive a course of steroids in the mid second trimester when initially  the symptomatology occurred. She was brought in for observation at 30  weeks and 6 days and there were no contractions and fetal fibronectin  was again negative. Since then, expectant management has ensued. She  was noted to be dilating on 2022, and the cervix was 2 to 3 cm  dilated. Betamethasone was ordered on 2022, and she received it  at 1600. When seen on 2022, cervix has now dilated to 3 to 4 cm. She is therefore being brought in for further betamethasone treatment as  well as IV magnesium for neuroprotection and further management  regarding potential development of active  labor. She denied  leaking fluid or vaginal bleeding. No change in fetal movements. No  change in her bowel or urinary habits.   Has not had intercourse the  whole pregnancy. PAST MEDICAL, SURGICAL, GYN, SOCIAL, FAMILY HISTORY:  Please refer to  ACOG forms A and B. REVIEW OF SYSTEMS:  CARDIAC:  Negative. RESPIRATORY:  Negative. GI:  Negative. :  Negative. NEUROLOGIC:  Negative. PSYCHIATRIC:  Negative. ENT:  Negative. INTEGUMENTARY:  Negative. HEMATOLOGIC:  Negative. LYMPHATIC:  Negative. CONSTITUTIONAL ABNORMALITIES:  Negative. PHYSICAL EXAMINATION:  VITAL SIGNS:  Her vital signs are stable. She is afebrile. HEENT:  Negative. LUNGS:  Clear to auscultation. CV:  Regular rate and rhythm. ABDOMEN:  Gravid, soft, nontender. Fetal heart tones are present in  140s with  beat to beat variability present. Acceleration is noted. No  contractions. Cervix is 3 to 4 cm dilated. 50% effaced. Vertex and 0  station. EXTREMITIES:  No clubbing or cyanosis. 2+ edema. No cords or erythema. ASSESSMENT:  Intrauterine pregnancy at 32 weeks and 5 days with gradual  cervical dilation. PLAN:  We will go ahead and administer a second dose of betamethasone. IV magnesium for neuroprotection. Group B Strep chemoprophylaxis. Further management based on clinical findings.         Charley Ha MD    D: 08/17/2022 15:34:26       T: 08/17/2022 19:36:43     [id number]  Job#: [id number]     Doc#: [id number]

## 2022-08-18 NOTE — PROGRESS NOTES
VSS, afebrile  No complaints except tired and has not slept due to continuous monitoring. Has good fetal movement. No contractions. Abdomen gravid, soft, non-tender  No contractions  FHT's 140 with +BTBV + accels    A: Doing well  P: Will continue IV magnesium for 24 hours after last dose of betamethasone. Will monitor in house for another 24 hours after that for further cervical change.

## 2022-08-19 VITALS
SYSTOLIC BLOOD PRESSURE: 101 MMHG | DIASTOLIC BLOOD PRESSURE: 56 MMHG | TEMPERATURE: 98.1 F | HEART RATE: 88 BPM | RESPIRATION RATE: 14 BRPM | OXYGEN SATURATION: 99 %

## 2022-08-19 PROCEDURE — 6370000000 HC RX 637 (ALT 250 FOR IP): Performed by: LEGAL MEDICINE

## 2022-08-19 PROCEDURE — 6360000002 HC RX W HCPCS: Performed by: LEGAL MEDICINE

## 2022-08-19 PROCEDURE — 2580000003 HC RX 258: Performed by: LEGAL MEDICINE

## 2022-08-19 RX ADMIN — PENICILLIN G 3 MILLION UNITS: 3000000 INJECTION, SOLUTION INTRAVENOUS at 04:13

## 2022-08-19 RX ADMIN — FAMOTIDINE 20 MG: 20 TABLET ORAL at 08:10

## 2022-08-19 RX ADMIN — PENICILLIN G 3 MILLION UNITS: 3000000 INJECTION, SOLUTION INTRAVENOUS at 12:07

## 2022-08-19 RX ADMIN — SODIUM CHLORIDE, POTASSIUM CHLORIDE, SODIUM LACTATE AND CALCIUM CHLORIDE: 600; 310; 30; 20 INJECTION, SOLUTION INTRAVENOUS at 08:57

## 2022-08-19 RX ADMIN — PENICILLIN G 3 MILLION UNITS: 3000000 INJECTION, SOLUTION INTRAVENOUS at 08:10

## 2022-08-19 ASSESSMENT — PAIN SCALES - GENERAL: PAINLEVEL_OUTOF10: 0

## 2022-08-19 NOTE — PROGRESS NOTES
Dr Dawna Littlejohn updated will discharge patient to home. Home instructions given patient verbalizes understanding no co's offered.  Discharged ambulatory

## 2022-08-19 NOTE — PROGRESS NOTES
Dr. Mireles Catching at bedside, performing SVE. Cervix unchanged. Last dose of penicillin to be at noon and then RN is to update Dr. Mireles Catching around 1600 for probable discharge.

## 2022-08-21 NOTE — DISCHARGE SUMMARY
[address]                               DISCHARGE SUMMARY    PATIENT NAME: Ugo Cabrales                      :        1995  MED REC NO:   [id number]                            ROOM:       03  ACCOUNT NO:   [de-identified]                           ADMIT DATE: 2022  PROVIDER:     Abdoul Cuevas MD                  DISCHARGE DATE:  2022. .. ADMITTING DIAGNOSES:  Intrauterine pregnancy at 28 and 1/2 weeks with  advanced cervical dilation. DISCHARGE DIAGNOSES:  Intrauterine pregnancy at 28 and 1/2 weeks with  advanced cervical dilation. PROCEDURE PERFORMED:  Administration of betamethasone, magnesium sulfate  for neuro protection as well as Group B strep chemoprophylaxis. HOSPITAL COURSE IN DETAIL:  The patient's course was unremarkable. Cervix remained unchanged. The fetal heart rate tracing remained  reactive. She underwent an ultrasound, which was also unremarkable. She had no complaints. She was therefore discharged  home 48 hours  after last dose of betamethasone with fetal movement and  labor  precautions and instructions to keep her followup appointment at the  office. She indicates understanding of all instructions.         Amadou Dodd MD    D: 2022 2:06:00       T: 2022 2:31:47     [email or web address]  Job#: [id number]     Doc#: [id number]    CC:

## 2022-09-15 ENCOUNTER — HOSPITAL ENCOUNTER (OUTPATIENT)
Age: 27
Setting detail: OBSERVATION
Discharge: HOME | End: 2022-09-15
Attending: LEGAL MEDICINE | Admitting: LEGAL MEDICINE
Payer: COMMERCIAL

## 2022-09-15 VITALS
DIASTOLIC BLOOD PRESSURE: 72 MMHG | SYSTOLIC BLOOD PRESSURE: 116 MMHG | TEMPERATURE: 98.2 F | BODY MASS INDEX: 38.14 KG/M2 | HEART RATE: 85 BPM | OXYGEN SATURATION: 99 % | RESPIRATION RATE: 16 BRPM | WEIGHT: 202 LBS | HEIGHT: 61 IN

## 2022-09-15 PROBLEM — O26.93 COMPLICATED PREGNANCY, THIRD TRIMESTER: Status: ACTIVE | Noted: 2022-09-15

## 2022-09-15 LAB
ABO/RH: NORMAL
ALBUMIN SERPL-MCNC: 3.5 G/DL (ref 3.5–5.2)
ALP BLD-CCNC: 152 U/L (ref 35–104)
ALT SERPL-CCNC: 15 U/L (ref 0–32)
ANION GAP SERPL CALCULATED.3IONS-SCNC: 15 MMOL/L (ref 7–16)
ANTIBODY SCREEN: NORMAL
AST SERPL-CCNC: 12 U/L (ref 0–31)
BASOPHILS ABSOLUTE: 0.03 E9/L (ref 0–0.2)
BASOPHILS RELATIVE PERCENT: 0.2 % (ref 0–2)
BILIRUB SERPL-MCNC: 0.5 MG/DL (ref 0–1.2)
BILIRUBIN URINE: NEGATIVE
BLOOD, URINE: NEGATIVE
BUN BLDV-MCNC: 6 MG/DL (ref 6–20)
CALCIUM SERPL-MCNC: 9.6 MG/DL (ref 8.6–10.2)
CHLORIDE BLD-SCNC: 102 MMOL/L (ref 98–107)
CLARITY: CLEAR
CO2: 18 MMOL/L (ref 22–29)
COLOR: ABNORMAL
CREAT SERPL-MCNC: 0.5 MG/DL (ref 0.5–1)
EOSINOPHILS ABSOLUTE: 0.13 E9/L (ref 0.05–0.5)
EOSINOPHILS RELATIVE PERCENT: 0.9 % (ref 0–6)
GFR AFRICAN AMERICAN: >60
GFR NON-AFRICAN AMERICAN: >60 ML/MIN/1.73
GLUCOSE BLD-MCNC: 94 MG/DL (ref 74–99)
GLUCOSE URINE: NEGATIVE MG/DL
HCT VFR BLD CALC: 35.6 % (ref 34–48)
HEMOGLOBIN: 12.1 G/DL (ref 11.5–15.5)
IMMATURE GRANULOCYTES #: 0.14 E9/L
IMMATURE GRANULOCYTES %: 1 % (ref 0–5)
KETONES, URINE: ABNORMAL MG/DL
LEUKOCYTE ESTERASE, URINE: NEGATIVE
LYMPHOCYTES ABSOLUTE: 2.78 E9/L (ref 1.5–4)
LYMPHOCYTES RELATIVE PERCENT: 20 % (ref 20–42)
MCH RBC QN AUTO: 30.8 PG (ref 26–35)
MCHC RBC AUTO-ENTMCNC: 34 % (ref 32–34.5)
MCV RBC AUTO: 90.6 FL (ref 80–99.9)
MONOCYTES ABSOLUTE: 0.87 E9/L (ref 0.1–0.95)
MONOCYTES RELATIVE PERCENT: 6.2 % (ref 2–12)
NEUTROPHILS ABSOLUTE: 9.98 E9/L (ref 1.8–7.3)
NEUTROPHILS RELATIVE PERCENT: 71.7 % (ref 43–80)
NITRITE, URINE: NEGATIVE
PDW BLD-RTO: 14 FL (ref 11.5–15)
PH UA: 6 (ref 5–9)
PLATELET # BLD: 252 E9/L (ref 130–450)
PMV BLD AUTO: 10.3 FL (ref 7–12)
POTASSIUM SERPL-SCNC: 3.9 MMOL/L (ref 3.5–5)
PROTEIN UA: NEGATIVE MG/DL
RBC # BLD: 3.93 E12/L (ref 3.5–5.5)
SODIUM BLD-SCNC: 135 MMOL/L (ref 132–146)
SPECIFIC GRAVITY UA: <=1.005 (ref 1–1.03)
TOTAL PROTEIN: 6.7 G/DL (ref 6.4–8.3)
UROBILINOGEN, URINE: 0.2 E.U./DL
WBC # BLD: 13.9 E9/L (ref 4.5–11.5)

## 2022-09-15 PROCEDURE — 80053 COMPREHEN METABOLIC PANEL: CPT

## 2022-09-15 PROCEDURE — 96361 HYDRATE IV INFUSION ADD-ON: CPT

## 2022-09-15 PROCEDURE — 99211 OFF/OP EST MAY X REQ PHY/QHP: CPT

## 2022-09-15 PROCEDURE — 85025 COMPLETE CBC W/AUTO DIFF WBC: CPT

## 2022-09-15 PROCEDURE — 36415 COLL VENOUS BLD VENIPUNCTURE: CPT

## 2022-09-15 PROCEDURE — 2580000003 HC RX 258: Performed by: LEGAL MEDICINE

## 2022-09-15 PROCEDURE — G0378 HOSPITAL OBSERVATION PER HR: HCPCS

## 2022-09-15 PROCEDURE — 86900 BLOOD TYPING SEROLOGIC ABO: CPT

## 2022-09-15 PROCEDURE — 86850 RBC ANTIBODY SCREEN: CPT

## 2022-09-15 PROCEDURE — 86901 BLOOD TYPING SEROLOGIC RH(D): CPT

## 2022-09-15 PROCEDURE — 81003 URINALYSIS AUTO W/O SCOPE: CPT

## 2022-09-15 PROCEDURE — 96360 HYDRATION IV INFUSION INIT: CPT

## 2022-09-15 RX ORDER — SODIUM CHLORIDE, SODIUM LACTATE, POTASSIUM CHLORIDE, AND CALCIUM CHLORIDE .6; .31; .03; .02 G/100ML; G/100ML; G/100ML; G/100ML
1000 INJECTION, SOLUTION INTRAVENOUS PRN
Status: DISCONTINUED | OUTPATIENT
Start: 2022-09-15 | End: 2022-09-15 | Stop reason: HOSPADM

## 2022-09-15 RX ORDER — ONDANSETRON 4 MG/1
4 TABLET, ORALLY DISINTEGRATING ORAL EVERY 8 HOURS PRN
Status: DISCONTINUED | OUTPATIENT
Start: 2022-09-15 | End: 2022-09-15 | Stop reason: HOSPADM

## 2022-09-15 RX ORDER — SODIUM CHLORIDE, SODIUM LACTATE, POTASSIUM CHLORIDE, AND CALCIUM CHLORIDE .6; .31; .03; .02 G/100ML; G/100ML; G/100ML; G/100ML
500 INJECTION, SOLUTION INTRAVENOUS PRN
Status: DISCONTINUED | OUTPATIENT
Start: 2022-09-15 | End: 2022-09-15 | Stop reason: HOSPADM

## 2022-09-15 RX ORDER — ONDANSETRON 2 MG/ML
4 INJECTION INTRAMUSCULAR; INTRAVENOUS EVERY 6 HOURS PRN
Status: DISCONTINUED | OUTPATIENT
Start: 2022-09-15 | End: 2022-09-15 | Stop reason: HOSPADM

## 2022-09-15 RX ORDER — ACETAMINOPHEN 325 MG/1
650 TABLET ORAL EVERY 4 HOURS PRN
Status: DISCONTINUED | OUTPATIENT
Start: 2022-09-15 | End: 2022-09-15 | Stop reason: HOSPADM

## 2022-09-15 RX ORDER — SODIUM CHLORIDE, SODIUM LACTATE, POTASSIUM CHLORIDE, CALCIUM CHLORIDE 600; 310; 30; 20 MG/100ML; MG/100ML; MG/100ML; MG/100ML
INJECTION, SOLUTION INTRAVENOUS CONTINUOUS
Status: DISCONTINUED | OUTPATIENT
Start: 2022-09-15 | End: 2022-09-15 | Stop reason: HOSPADM

## 2022-09-15 RX ADMIN — SODIUM CHLORIDE, POTASSIUM CHLORIDE, SODIUM LACTATE AND CALCIUM CHLORIDE: 600; 310; 30; 20 INJECTION, SOLUTION INTRAVENOUS at 03:45

## 2022-09-15 RX ADMIN — SODIUM CHLORIDE, POTASSIUM CHLORIDE, SODIUM LACTATE AND CALCIUM CHLORIDE: 600; 310; 30; 20 INJECTION, SOLUTION INTRAVENOUS at 10:18

## 2022-09-15 NOTE — H&P
[address]                              HISTORY AND PHYSICAL    PATIENT NAME: Alvina Denis                      :        1995  MED REC NO:   [id number]                            ROOM:       [facility]  ACCOUNT NO:   [de-identified]                           ADMIT DATE: 09/15/2022. .. PROVIDER:     Darshana Hanley MD    HISTORY OF PRESENT ILLNESS:  A 76-year-old white female presented on  09/15/2022 with complaints of pelvic pressure. No leaking fluid or  vaginal bleeding. No change in fetal movements. No change in her bowel  or urinary habits. Group B strep is negative. For her past medical, surgical, GYN, social, family history, please  refer to ACOG forms A and B. REVIEW OF SYSTEMS:  Negative for cardiac, respiratory, GI, ,  neurologic, psychiatric, ENT, integument, hematologic, lymphatic,  constitutional abnormalities. PHYSICAL EXAMINATION:  VITAL SIGNS:  Stable. Blood pressures 134/80, pulse 93, respiratory  rate 16, temperature 98.4, O2 sat 100% on room air. HEENT:  Negative. LUNGS:  Clear to auscultation. CV:  Regular rate and rhythm. ABDOMEN:  Gravid, soft, nontender. Fetal heart tones are present in  140s with xetm-pn-puuu variability present. Acceleration is noted. Rare contractions. Cervix is 4-5 cm dilated, 70% effaced, vertex and -1  in posterior. EXTREMITIES:  No clubbing, cyanosis. 2+ edema. No cords or erythema. ASSESSMENT:  Intrauterine pregnancy at 36 weeks and 6 days with  complaints of pelvic discomfort. PLAN:  We will go ahead and obtain CBC, CMP, UA. We will initiate IV  fluids. Monitor cervical exam and fetal heart rate tracing. Further  management based on clinical findings.         Francisca Oliver MD    D: 09/15/2022 19:01:03       T: 09/15/2022 19:03:24     [email or web address]  Job#: [id number]     Doc#: [id number]    CC:

## 2022-09-15 NOTE — PROGRESS NOTES
Dr Maureen Thomson called and notified of pt admission, complaints, and JEAN CLAUDE  Notified of efm tracing with contrx pattern  Orders received

## 2022-09-15 NOTE — PROGRESS NOTES
Patient return to [facility] after ambulating states no longer with lower back ache from ambulating, states vaginal pressure has returned

## 2022-09-15 NOTE — PROGRESS NOTES
Patient ambulatory to room triage #2,  for admission for vaginal pressure intermittenly  Patient and family oriented to room. Call light explained and provided. EFM applied with consent to central monitor bank with alarms on. Uterus soft and non-tender. Admission history obtained, laboratory results reviewed and appropriate consents signed/reviewed. Questions and concerns addressed/answered.

## 2022-09-15 NOTE — DISCHARGE SUMMARY
[address]                               DISCHARGE SUMMARY    PATIENT NAME: Roxi Dyer                      :        1995  MED REC NO:   [id number]                            ROOM:       [facility]02  ACCOUNT NO:   [de-identified]                           ADMIT DATE: 09/15/2022  PROVIDER:     Cassy Bhatia MD                  DISCHARGE DATE:  09/15/2022. .. ADMITTING DIAGNOSIS:  Intrauterine pregnancy at 36-1/2 weeks with  complaints of pelvic discomfort. Threatened labor. DISCHARGE DIAGNOSIS:  No evidence of maternal or fetal compromise. HOSPITAL COURSE IN DETAIL:  The patient's repeat cervical exam remained  unchanged. Fetal heart rate tracing remained reactive and she only had  rare contractions. Her pelvic discomfort also resolved. Her labs  revealed creatinine of 0.5, ALT 15, AST 12, glucose 94. White count  13.9, hemoglobin 12.1, platelets 414,510. UA was negative. Further  monitoring was offered, but she declined and decided to go home. She  was therefore discharged home with fetal movement and labor precautions  and instructions to keep her followup appointment at the office. She  indicated understanding of all instructions.         Marisol Lees MD    D: 09/15/2022 19:02:53       T: 09/15/2022 19:06:57     [email or web address]  Job#: [id number]     Doc#: [id number]    CC:

## 2022-09-15 NOTE — DISCHARGE INSTRUCTIONS
Home Undelivered Discharge Instructions    After Discharge Orders:    No future appointments. Call physician or for instructions. Diet:  general diet    Rest: normal activity as tolerated    Other instructions: Do kick counts once a day on your baby. Choose the time of day your baby is most active. Get in a comfortable lying or sitting position and time how long it takes to feel 10 kicks, twists, turns, swishes, or rolls.  Call your physician or midwife if there have not been 10 kicks in 1 hours    Call physician or midwife, return to Labor and Delivery, call 911, or go to the nearest Emergency Room if: decreased fetal movement, persistent low back pain or cramping, or bleeding from vaginal area

## 2022-09-18 ENCOUNTER — HOSPITAL ENCOUNTER (OUTPATIENT)
Age: 27
Discharge: HOME | End: 2022-09-18
Attending: LEGAL MEDICINE | Admitting: LEGAL MEDICINE
Payer: COMMERCIAL

## 2022-09-18 ENCOUNTER — APPOINTMENT (OUTPATIENT)
Dept: LABOR AND DELIVERY | Age: 27
End: 2022-09-18
Payer: COMMERCIAL

## 2022-09-18 VITALS
TEMPERATURE: 98.2 F | HEART RATE: 104 BPM | DIASTOLIC BLOOD PRESSURE: 72 MMHG | RESPIRATION RATE: 16 BRPM | SYSTOLIC BLOOD PRESSURE: 113 MMHG

## 2022-09-18 PROBLEM — O26.90 PREGNANCY, COMPLICATED: Status: ACTIVE | Noted: 2022-09-18

## 2022-09-18 PROCEDURE — 59025 FETAL NON-STRESS TEST: CPT

## 2022-09-18 NOTE — PROGRESS NOTES
[facility] by 2 [facility]ns. Patient pulse retaken and WNL. Patient ok for discharge.

## 2022-09-18 NOTE — PROGRESS NOTES
GP:1/0    EDC/WEEKS: 37w2d 10/7/2022    Here for NST today for polyhydraminos    Patient placed on EFM where audible fetal movement noted by RN and positively perceived by patient. She denies LOF/Vaginal bleeding and reports good health. Patient understanding of testing, call light given.

## 2022-09-22 ENCOUNTER — HOSPITAL ENCOUNTER (OUTPATIENT)
Age: 27
Discharge: HOME | End: 2022-09-22
Attending: LEGAL MEDICINE | Admitting: LEGAL MEDICINE
Payer: COMMERCIAL

## 2022-09-22 ENCOUNTER — APPOINTMENT (OUTPATIENT)
Dept: LABOR AND DELIVERY | Age: 27
End: 2022-09-22
Payer: COMMERCIAL

## 2022-09-22 VITALS
DIASTOLIC BLOOD PRESSURE: 73 MMHG | HEART RATE: 98 BPM | SYSTOLIC BLOOD PRESSURE: 120 MMHG | TEMPERATURE: 98.9 F | RESPIRATION RATE: 16 BRPM | OXYGEN SATURATION: 98 %

## 2022-09-22 PROBLEM — Z3A.37 37 WEEKS GESTATION OF PREGNANCY: Status: ACTIVE | Noted: 2022-09-22

## 2022-09-22 PROCEDURE — 59025 FETAL NON-STRESS TEST: CPT

## 2022-09-22 NOTE — PROGRESS NOTES
G 1 P 0   37.6 week   Pt of Dr. Karin Wade  Arrived ambulatory to unit for scheduled NST due to polyhydraminos  Pt denies any bleeding or leaking of fluid, denies contractions. Pt stated that she perceives fetal movement and has not had any other complications with pregnancy. Test discussed with pt who verbalized understanding.

## 2022-09-25 ENCOUNTER — APPOINTMENT (OUTPATIENT)
Dept: LABOR AND DELIVERY | Age: 27
End: 2022-09-25
Payer: COMMERCIAL

## 2022-09-25 ENCOUNTER — HOSPITAL ENCOUNTER (OUTPATIENT)
Age: 27
Discharge: HOME | End: 2022-09-25
Attending: LEGAL MEDICINE | Admitting: LEGAL MEDICINE
Payer: COMMERCIAL

## 2022-09-25 VITALS
DIASTOLIC BLOOD PRESSURE: 61 MMHG | SYSTOLIC BLOOD PRESSURE: 120 MMHG | OXYGEN SATURATION: 97 % | RESPIRATION RATE: 16 BRPM | TEMPERATURE: 98.8 F | HEART RATE: 100 BPM

## 2022-09-25 PROBLEM — O26.90 COMPLICATED PREGNANCY: Status: ACTIVE | Noted: 2022-09-25

## 2022-09-25 PROCEDURE — 59025 FETAL NON-STRESS TEST: CPT

## 2022-09-25 NOTE — PROGRESS NOTES
Dr Favio Perez notified of patient with reactive nst, some contractions, patient not feeling contractions, orders to discharge received

## 2022-09-27 ENCOUNTER — HOSPITAL ENCOUNTER (INPATIENT)
Age: 27
LOS: 2 days | Discharge: HOME | End: 2022-09-29
Attending: LEGAL MEDICINE | Admitting: LEGAL MEDICINE
Payer: COMMERCIAL

## 2022-09-27 PROBLEM — Z34.90 TERM PREGNANCY: Status: ACTIVE | Noted: 2022-09-27

## 2022-09-27 LAB
ABO/RH: NORMAL
ALBUMIN SERPL-MCNC: 3.7 G/DL (ref 3.5–5.2)
ALP BLD-CCNC: 158 U/L (ref 35–104)
ALT SERPL-CCNC: 19 U/L (ref 0–32)
AMPHETAMINE SCREEN, URINE: NOT DETECTED
ANION GAP SERPL CALCULATED.3IONS-SCNC: 15 MMOL/L (ref 7–16)
ANTIBODY SCREEN: NORMAL
AST SERPL-CCNC: 12 U/L (ref 0–31)
BARBITURATE SCREEN URINE: NOT DETECTED
BENZODIAZEPINE SCREEN, URINE: NOT DETECTED
BILIRUB SERPL-MCNC: 0.7 MG/DL (ref 0–1.2)
BUN BLDV-MCNC: 7 MG/DL (ref 6–20)
CALCIUM SERPL-MCNC: 9.4 MG/DL (ref 8.6–10.2)
CANNABINOID SCREEN URINE: NOT DETECTED
CHLORIDE BLD-SCNC: 102 MMOL/L (ref 98–107)
CO2: 16 MMOL/L (ref 22–29)
COCAINE METABOLITE SCREEN URINE: NOT DETECTED
CREAT SERPL-MCNC: 0.5 MG/DL (ref 0.5–1)
FENTANYL SCREEN, URINE: NOT DETECTED
GFR AFRICAN AMERICAN: >60
GFR NON-AFRICAN AMERICAN: >60 ML/MIN/1.73
GLUCOSE BLD-MCNC: 124 MG/DL (ref 74–99)
HCT VFR BLD CALC: 35.6 % (ref 34–48)
HEMOGLOBIN: 12.5 G/DL (ref 11.5–15.5)
Lab: NORMAL
MCH RBC QN AUTO: 31.1 PG (ref 26–35)
MCHC RBC AUTO-ENTMCNC: 35.1 % (ref 32–34.5)
MCV RBC AUTO: 88.6 FL (ref 80–99.9)
METHADONE SCREEN, URINE: NOT DETECTED
OPIATE SCREEN URINE: NOT DETECTED
OXYCODONE URINE: NOT DETECTED
PDW BLD-RTO: 13.9 FL (ref 11.5–15)
PHENCYCLIDINE SCREEN URINE: NOT DETECTED
PLATELET # BLD: 237 E9/L (ref 130–450)
PMV BLD AUTO: 10.5 FL (ref 7–12)
POTASSIUM SERPL-SCNC: 3.6 MMOL/L (ref 3.5–5)
RBC # BLD: 4.02 E12/L (ref 3.5–5.5)
SODIUM BLD-SCNC: 133 MMOL/L (ref 132–146)
TOTAL PROTEIN: 6.9 G/DL (ref 6.4–8.3)
WBC # BLD: 20 E9/L (ref 4.5–11.5)

## 2022-09-27 PROCEDURE — 6370000000 HC RX 637 (ALT 250 FOR IP): Performed by: LEGAL MEDICINE

## 2022-09-27 PROCEDURE — 86901 BLOOD TYPING SEROLOGIC RH(D): CPT

## 2022-09-27 PROCEDURE — 96361 HYDRATE IV INFUSION ADD-ON: CPT

## 2022-09-27 PROCEDURE — 96360 HYDRATION IV INFUSION INIT: CPT

## 2022-09-27 PROCEDURE — 86900 BLOOD TYPING SEROLOGIC ABO: CPT

## 2022-09-27 PROCEDURE — 0KQM0ZZ REPAIR PERINEUM MUSCLE, OPEN APPROACH: ICD-10-PCS | Performed by: LEGAL MEDICINE

## 2022-09-27 PROCEDURE — 85027 COMPLETE CBC AUTOMATED: CPT

## 2022-09-27 PROCEDURE — 36415 COLL VENOUS BLD VENIPUNCTURE: CPT

## 2022-09-27 PROCEDURE — 6360000002 HC RX W HCPCS: Performed by: LEGAL MEDICINE

## 2022-09-27 PROCEDURE — 2500000003 HC RX 250 WO HCPCS

## 2022-09-27 PROCEDURE — 80307 DRUG TEST PRSMV CHEM ANLYZR: CPT

## 2022-09-27 PROCEDURE — 7200000001 HC VAGINAL DELIVERY

## 2022-09-27 PROCEDURE — 88307 TISSUE EXAM BY PATHOLOGIST: CPT

## 2022-09-27 PROCEDURE — 2580000003 HC RX 258: Performed by: LEGAL MEDICINE

## 2022-09-27 PROCEDURE — 1220000001 HC SEMI PRIVATE L&D R&B

## 2022-09-27 PROCEDURE — 86850 RBC ANTIBODY SCREEN: CPT

## 2022-09-27 PROCEDURE — 2500000003 HC RX 250 WO HCPCS: Performed by: LEGAL MEDICINE

## 2022-09-27 PROCEDURE — 6360000002 HC RX W HCPCS

## 2022-09-27 PROCEDURE — 80053 COMPREHEN METABOLIC PANEL: CPT

## 2022-09-27 RX ORDER — MODIFIED LANOLIN
OINTMENT (GRAM) TOPICAL PRN
Status: DISCONTINUED | OUTPATIENT
Start: 2022-09-27 | End: 2022-09-29 | Stop reason: HOSPADM

## 2022-09-27 RX ORDER — ACETAMINOPHEN 500 MG
1000 TABLET ORAL EVERY 8 HOURS
Status: DISCONTINUED | OUTPATIENT
Start: 2022-09-27 | End: 2022-09-29 | Stop reason: HOSPADM

## 2022-09-27 RX ORDER — SODIUM CHLORIDE, SODIUM LACTATE, POTASSIUM CHLORIDE, AND CALCIUM CHLORIDE .6; .31; .03; .02 G/100ML; G/100ML; G/100ML; G/100ML
500 INJECTION, SOLUTION INTRAVENOUS PRN
Status: DISCONTINUED | OUTPATIENT
Start: 2022-09-27 | End: 2022-09-27

## 2022-09-27 RX ORDER — LIDOCAINE HYDROCHLORIDE 10 MG/ML
INJECTION, SOLUTION INFILTRATION; PERINEURAL
Status: COMPLETED
Start: 2022-09-27 | End: 2022-09-27

## 2022-09-27 RX ORDER — SODIUM CHLORIDE 9 MG/ML
INJECTION, SOLUTION INTRAVENOUS PRN
Status: DISCONTINUED | OUTPATIENT
Start: 2022-09-27 | End: 2022-09-29 | Stop reason: HOSPADM

## 2022-09-27 RX ORDER — SODIUM CHLORIDE 0.9 % (FLUSH) 0.9 %
5-40 SYRINGE (ML) INJECTION EVERY 12 HOURS SCHEDULED
Status: DISCONTINUED | OUTPATIENT
Start: 2022-09-27 | End: 2022-09-27

## 2022-09-27 RX ORDER — OXYCODONE HYDROCHLORIDE 5 MG/1
5 TABLET ORAL EVERY 6 HOURS PRN
Status: DISCONTINUED | OUTPATIENT
Start: 2022-09-27 | End: 2022-09-29 | Stop reason: HOSPADM

## 2022-09-27 RX ORDER — CEFAZOLIN SODIUM 1 G/3ML
INJECTION, POWDER, FOR SOLUTION INTRAMUSCULAR; INTRAVENOUS
Status: COMPLETED
Start: 2022-09-27 | End: 2022-09-27

## 2022-09-27 RX ORDER — MISOPROSTOL 200 UG/1
800 TABLET ORAL PRN
Status: DISCONTINUED | OUTPATIENT
Start: 2022-09-27 | End: 2022-09-27

## 2022-09-27 RX ORDER — SODIUM CHLORIDE, SODIUM LACTATE, POTASSIUM CHLORIDE, CALCIUM CHLORIDE 600; 310; 30; 20 MG/100ML; MG/100ML; MG/100ML; MG/100ML
INJECTION, SOLUTION INTRAVENOUS CONTINUOUS
Status: DISCONTINUED | OUTPATIENT
Start: 2022-09-27 | End: 2022-09-29 | Stop reason: HOSPADM

## 2022-09-27 RX ORDER — METHYLERGONOVINE MALEATE 0.2 MG/ML
200 INJECTION INTRAVENOUS PRN
Status: DISCONTINUED | OUTPATIENT
Start: 2022-09-27 | End: 2022-09-27

## 2022-09-27 RX ORDER — SODIUM CHLORIDE 0.9 % (FLUSH) 0.9 %
5-40 SYRINGE (ML) INJECTION EVERY 12 HOURS SCHEDULED
Status: DISCONTINUED | OUTPATIENT
Start: 2022-09-27 | End: 2022-09-29 | Stop reason: HOSPADM

## 2022-09-27 RX ORDER — DOCUSATE SODIUM 100 MG/1
100 CAPSULE, LIQUID FILLED ORAL 2 TIMES DAILY
Status: DISCONTINUED | OUTPATIENT
Start: 2022-09-27 | End: 2022-09-29 | Stop reason: HOSPADM

## 2022-09-27 RX ORDER — SODIUM CHLORIDE 0.9 % (FLUSH) 0.9 %
5-40 SYRINGE (ML) INJECTION PRN
Status: DISCONTINUED | OUTPATIENT
Start: 2022-09-27 | End: 2022-09-29 | Stop reason: HOSPADM

## 2022-09-27 RX ORDER — FERROUS SULFATE 325(65) MG
325 TABLET ORAL
Status: DISCONTINUED | OUTPATIENT
Start: 2022-09-27 | End: 2022-09-29 | Stop reason: HOSPADM

## 2022-09-27 RX ORDER — LIDOCAINE HYDROCHLORIDE 10 MG/ML
30 INJECTION, SOLUTION EPIDURAL; INFILTRATION; INTRACAUDAL; PERINEURAL PRN
Status: COMPLETED | OUTPATIENT
Start: 2022-09-27 | End: 2022-09-27

## 2022-09-27 RX ORDER — SODIUM CHLORIDE 9 MG/ML
25 INJECTION, SOLUTION INTRAVENOUS PRN
Status: DISCONTINUED | OUTPATIENT
Start: 2022-09-27 | End: 2022-09-27

## 2022-09-27 RX ORDER — ONDANSETRON 2 MG/ML
4 INJECTION INTRAMUSCULAR; INTRAVENOUS EVERY 6 HOURS PRN
Status: DISCONTINUED | OUTPATIENT
Start: 2022-09-27 | End: 2022-09-27

## 2022-09-27 RX ORDER — IBUPROFEN 400 MG/1
400 TABLET ORAL EVERY 6 HOURS PRN
Status: DISCONTINUED | OUTPATIENT
Start: 2022-09-27 | End: 2022-09-29 | Stop reason: HOSPADM

## 2022-09-27 RX ORDER — SODIUM CHLORIDE, SODIUM LACTATE, POTASSIUM CHLORIDE, CALCIUM CHLORIDE 600; 310; 30; 20 MG/100ML; MG/100ML; MG/100ML; MG/100ML
INJECTION, SOLUTION INTRAVENOUS CONTINUOUS
Status: DISCONTINUED | OUTPATIENT
Start: 2022-09-27 | End: 2022-09-27

## 2022-09-27 RX ORDER — SODIUM CHLORIDE, SODIUM LACTATE, POTASSIUM CHLORIDE, AND CALCIUM CHLORIDE .6; .31; .03; .02 G/100ML; G/100ML; G/100ML; G/100ML
1000 INJECTION, SOLUTION INTRAVENOUS PRN
Status: DISCONTINUED | OUTPATIENT
Start: 2022-09-27 | End: 2022-09-27

## 2022-09-27 RX ORDER — OXYCODONE HYDROCHLORIDE 5 MG/1
10 TABLET ORAL EVERY 4 HOURS PRN
Status: DISCONTINUED | OUTPATIENT
Start: 2022-09-27 | End: 2022-09-29 | Stop reason: HOSPADM

## 2022-09-27 RX ORDER — CARBOPROST TROMETHAMINE 250 UG/ML
250 INJECTION, SOLUTION INTRAMUSCULAR PRN
Status: DISCONTINUED | OUTPATIENT
Start: 2022-09-27 | End: 2022-09-27

## 2022-09-27 RX ORDER — SODIUM CHLORIDE 0.9 % (FLUSH) 0.9 %
5-40 SYRINGE (ML) INJECTION PRN
Status: DISCONTINUED | OUTPATIENT
Start: 2022-09-27 | End: 2022-09-27

## 2022-09-27 RX ADMIN — ACETAMINOPHEN 1000 MG: 500 TABLET ORAL at 06:44

## 2022-09-27 RX ADMIN — SODIUM CHLORIDE, POTASSIUM CHLORIDE, SODIUM LACTATE AND CALCIUM CHLORIDE 1000 ML: 600; 310; 30; 20 INJECTION, SOLUTION INTRAVENOUS at 03:00

## 2022-09-27 RX ADMIN — LIDOCAINE HYDROCHLORIDE 30 ML: 10 INJECTION, SOLUTION EPIDURAL; INFILTRATION; INTRACAUDAL; PERINEURAL at 04:43

## 2022-09-27 RX ADMIN — DOCUSATE SODIUM 100 MG: 100 CAPSULE, LIQUID FILLED ORAL at 08:40

## 2022-09-27 RX ADMIN — CEFAZOLIN 2000 MG: 1 INJECTION, POWDER, FOR SOLUTION INTRAMUSCULAR; INTRAVENOUS at 04:51

## 2022-09-27 RX ADMIN — Medication 0.5 MG: at 04:44

## 2022-09-27 RX ADMIN — DOCUSATE SODIUM 100 MG: 100 CAPSULE, LIQUID FILLED ORAL at 23:12

## 2022-09-27 RX ADMIN — LIDOCAINE HYDROCHLORIDE 100 MG: 10 INJECTION, SOLUTION INFILTRATION; PERINEURAL at 04:57

## 2022-09-27 RX ADMIN — SODIUM CHLORIDE, POTASSIUM CHLORIDE, SODIUM LACTATE AND CALCIUM CHLORIDE: 600; 310; 30; 20 INJECTION, SOLUTION INTRAVENOUS at 03:54

## 2022-09-27 RX ADMIN — ACETAMINOPHEN 1000 MG: 500 TABLET ORAL at 14:30

## 2022-09-27 RX ADMIN — Medication 10 UNITS: at 04:41

## 2022-09-27 RX ADMIN — HYDROMORPHONE HYDROCHLORIDE 0.5 MG: 1 INJECTION, SOLUTION INTRAMUSCULAR; INTRAVENOUS; SUBCUTANEOUS at 04:44

## 2022-09-27 RX ADMIN — ACETAMINOPHEN 1000 MG: 500 TABLET ORAL at 23:12

## 2022-09-27 RX ADMIN — Medication: at 06:44

## 2022-09-27 ASSESSMENT — PAIN DESCRIPTION - LOCATION
LOCATION: PERINEUM
LOCATION: VAGINA
LOCATION: PERINEUM
LOCATION: PERINEUM

## 2022-09-27 ASSESSMENT — PAIN DESCRIPTION - DESCRIPTORS
DESCRIPTORS: DISCOMFORT
DESCRIPTORS: ACHING;SORE
DESCRIPTORS: SORE

## 2022-09-27 ASSESSMENT — PAIN SCALES - GENERAL
PAINLEVEL_OUTOF10: 10
PAINLEVEL_OUTOF10: 4

## 2022-09-27 ASSESSMENT — PAIN DESCRIPTION - ORIENTATION
ORIENTATION: LOWER
ORIENTATION: LOWER

## 2022-09-27 ASSESSMENT — PAIN - FUNCTIONAL ASSESSMENT
PAIN_FUNCTIONAL_ASSESSMENT: ACTIVITIES ARE NOT PREVENTED
PAIN_FUNCTIONAL_ASSESSMENT: ACTIVITIES ARE NOT PREVENTED

## 2022-09-27 NOTE — PROGRESS NOTES
Term pt presented in LND via [facility] complaining of contractions. Positive fetal movement noted. Pt oriented to LD 5. VE completed. Plan of care discussed. Pt verbalizes understanding.

## 2022-09-27 NOTE — OP NOTE
[address]                                OPERATIVE REPORT    PATIENT NAME: Gertrude Santizo                      :        1995  MED REC NO:   [id number]                            ROOM:       05  ACCOUNT NO:   [de-identified]                           ADMIT DATE: 2022. .. PROVIDER:     Oriana Park MD    DATE OF PROCEDURE:  2022    The patient began to feel the urge to push. She was placed in [place]  position initially. Fetal bradycardia was noted, so she was switched  over to knee-chest position. She pushed in that position for an hour  and half. She got tired. She was placed back in [place] position. She continued to push. She became fatigued. She requested assistance  with vacuum. Verbal consent for use of vacuum was obtained. Cervix was  complete, complete vertex, +3 occiput anterior. She was in [place]  position. Bladder was empty. Vacuum was placed and placement checked. Gentle upward traction allowed delivery of head after one pop-off and  one application of 10 seconds. Fetal trunk was delivered with minimal  traction applied in an axis parallel to the fetal spine after the  shoulder had cleared the pubic bone. Nares and hypopharynx were  suctioned. Delayed cord clamping was performed. Cord was clamped and  cut. Infant was crying and vigorous. Cord gases and blood samples were  obtained. Placenta was removed spontaneously. Placenta was intact and  sent to pathology. No cervical lacerations were noted. Repair of  midline vaginal laceration and second-degree midline laceration was  performed in a running fashion. Estimated blood loss was 400 mL. Bedside exam of the infant revealed no gross abnormalities. The cord  arterial gas pH is 7.199, base excess -7.4. Cord venous gas; pH 7.200,  base excess -6.2.   Mother and infant went to recovery room in stable  condition. Pediatrician is house physician.       Williams Helms MD    D: 09/27/2022 5:33:02       T: 09/27/2022 5:35:18     [email or web address]  Job#: [id number]     Doc#: [id number]    CC:

## 2022-09-27 NOTE — PROGRESS NOTES
Patient assisted up to shower. Tolerated well. Bed linens changed. Gown provided. Radha care complete.

## 2022-09-27 NOTE — H&P
[address]                              HISTORY AND PHYSICAL    PATIENT NAME: Mehdi James                      :        1995  MED REC NO:   [id number]                            ROOM:       LD05  ACCOUNT NO:   [de-identified]                           ADMIT DATE: 2022. .. PROVIDER:     Brenda Alfonso MD    HISTORY OF PRESENT ILLNESS:  The patient is a 61-year-old white female  who presented on 2022 with complaints of regular uterine  contractions starting 2 hours prior to presentation. No leaking fluid  or vaginal bleeding. No change in fetal movements. Had spontaneous  rupture of membranes when she came to labor and delivery with light  meconium noted. Has had cervical shortening and funneling starting  approximately mid second trimester. Had received progesterone  suppositories and expectant management. Ended up receiving 2 courses of  steroids and then has been 4 cm to 5 cm dilated for several weeks and  then presented to labor and delivery. Group B strep is negative. For her past medical, surgical, GYN, social, and family history; please  refer ACOG forms A and B. REVIEW OF SYSTEMS:  Negative for cardiac, respiratory, GI,   abnormalities. PHYSICAL EXAMINATION:  VITAL SIGNS:  Stable. She is afebrile. ABDOMEN:  Fetal heart tones are present in the 140s with [id number]-bk-[id number]  variability present. Acceleration is noted. Contractions are every 3  minutes. Cervix is complete, complete vertex, +1 station. Estimated  fetal weight is 3500 gm. EXTREMITIES:  No clubbing, cyanosis. 2+ edema. No cords or erythema. ASSESSMENT:  Intrauterine pregnancy at 38-1/2 weeks, in labor. PLAN:  Risks of vaginal delivery and operative delivery have been  reviewed with her. Indications for operative delivery have been  reviewed with her.   Shoulder dystocia and birth trauma have been  reviewed with her. All her questions have been answered and she wishes  to proceed with attempt at vaginal delivery.         Janett Blanca MD    D: 09/27/2022 5:30:38       T: 09/27/2022 5:32:54     [email or web address]  Job#: [id number]     Doc#: [id number]    CC:

## 2022-09-27 NOTE — PROGRESS NOTES
Pt ambulated to BR, voided after IV infiltrated. Medicated per order. PT returned to bed. Rest encouraged.

## 2022-09-27 NOTE — DISCHARGE INSTRUCTIONS
May discharge home     IF FROM               until  discharge      Temp less than 100 F   BP less than   140/90   No nausea or  vomiting   Heart rate    Oxygen saturation 95% or higher   Ambulating well   Voiding well   Minimal bleeding   No chest pain, shortness of breath, dyspnea on exertion   Pain controlled with oral pain meds   No calf or leg pain or redness   No complaints      Nothing per vagina and no tub baths for 6 weeks. May shower. Call doctor with any problems.     Home with routine  POST PARTUM     precautions    CALL DOCTOR BEFORE PATIENT IS DISCHARGED    Return to office: 11/9  9 am

## 2022-09-28 LAB — HEMOGLOBIN: 11 G/DL (ref 11.5–15.5)

## 2022-09-28 PROCEDURE — 85018 HEMOGLOBIN: CPT

## 2022-09-28 PROCEDURE — 36415 COLL VENOUS BLD VENIPUNCTURE: CPT

## 2022-09-28 PROCEDURE — 1220000001 HC SEMI PRIVATE L&D R&B

## 2022-09-28 PROCEDURE — 6370000000 HC RX 637 (ALT 250 FOR IP): Performed by: LEGAL MEDICINE

## 2022-09-28 RX ADMIN — DOCUSATE SODIUM 100 MG: 100 CAPSULE, LIQUID FILLED ORAL at 22:41

## 2022-09-28 RX ADMIN — ACETAMINOPHEN 1000 MG: 500 TABLET ORAL at 07:15

## 2022-09-28 RX ADMIN — ACETAMINOPHEN 1000 MG: 500 TABLET ORAL at 22:41

## 2022-09-28 RX ADMIN — DOCUSATE SODIUM 100 MG: 100 CAPSULE, LIQUID FILLED ORAL at 08:32

## 2022-09-28 RX ADMIN — ACETAMINOPHEN 1000 MG: 500 TABLET ORAL at 17:11

## 2022-09-28 ASSESSMENT — PAIN DESCRIPTION - DESCRIPTORS
DESCRIPTORS: THROBBING
DESCRIPTORS: SORE

## 2022-09-28 ASSESSMENT — PAIN SCALES - GENERAL
PAINLEVEL_OUTOF10: 7
PAINLEVEL_OUTOF10: 2
PAINLEVEL_OUTOF10: 4
PAINLEVEL_OUTOF10: 4

## 2022-09-28 ASSESSMENT — PAIN - FUNCTIONAL ASSESSMENT: PAIN_FUNCTIONAL_ASSESSMENT: ACTIVITIES ARE NOT PREVENTED

## 2022-09-28 ASSESSMENT — PAIN DESCRIPTION - LOCATION
LOCATION: PERINEUM
LOCATION: VAGINA

## 2022-09-28 NOTE — PROGRESS NOTES
RN to bedside for initial patient contact. White board updated with  staff changes, goals, plan of care, and pain medication availability. Fresh water offered  and was   Declined. Call light within reach, no concerns at this time. Rest encouraged.

## 2022-09-28 NOTE — PROGRESS NOTES
Hearing screening results were discussed with parent. Questions answered. Brochure given to parent. Advised to monitor developmental milestones and contact physician for any concerns.    Electronically signed by Tony Watkins on 9/28/2022 at 9:57 AM

## 2022-09-29 VITALS
HEART RATE: 83 BPM | DIASTOLIC BLOOD PRESSURE: 54 MMHG | RESPIRATION RATE: 16 BRPM | OXYGEN SATURATION: 98 % | TEMPERATURE: 97.8 F | BODY MASS INDEX: 37.95 KG/M2 | WEIGHT: 201 LBS | HEIGHT: 61 IN | SYSTOLIC BLOOD PRESSURE: 89 MMHG

## 2022-09-29 PROBLEM — Z34.93 NORMAL PREGNANCY, THIRD TRIMESTER: Status: RESOLVED | Noted: 2022-08-04 | Resolved: 2022-09-29

## 2022-09-29 PROCEDURE — 6370000000 HC RX 637 (ALT 250 FOR IP): Performed by: LEGAL MEDICINE

## 2022-09-29 PROCEDURE — 6360000002 HC RX W HCPCS: Performed by: LEGAL MEDICINE

## 2022-09-29 PROCEDURE — 90471 IMMUNIZATION ADMIN: CPT | Performed by: LEGAL MEDICINE

## 2022-09-29 PROCEDURE — 90715 TDAP VACCINE 7 YRS/> IM: CPT | Performed by: LEGAL MEDICINE

## 2022-09-29 RX ADMIN — ACETAMINOPHEN 1000 MG: 500 TABLET ORAL at 06:43

## 2022-09-29 RX ADMIN — DOCUSATE SODIUM 100 MG: 100 CAPSULE, LIQUID FILLED ORAL at 07:27

## 2022-09-29 RX ADMIN — TETANUS TOXOID, REDUCED DIPHTHERIA TOXOID AND ACELLULAR PERTUSSIS VACCINE, ADSORBED 0.5 ML: 5; 2.5; 8; 8; 2.5 SUSPENSION INTRAMUSCULAR at 10:36

## 2022-09-29 ASSESSMENT — PAIN SCALES - GENERAL: PAINLEVEL_OUTOF10: 3

## 2022-09-29 NOTE — DISCHARGE SUMMARY
[address]                               DISCHARGE SUMMARY    PATIENT NAME: Janene Hagan                      :        1995  MED REC NO:   [id number]                            ROOM:       05  ACCOUNT NO:   [de-identified]                           ADMIT DATE: 2022. .. PROVIDER:     Lawrence Swan MD                  DISCHARGE DATE:      ADMITTING DIAGNOSIS:  Intrauterine pregnancy at 38-1/2 weeks in labor. DISCHARGE DIAGNOSIS:  Intrauterine pregnancy at 38-1/2 weeks in labor. PROCEDURE PERFORMED:  Vaginal delivery. HOSPITAL COURSE IN DETAIL:  The patient is doing well. Voiding well. Minimal lochia. Pain is under adequate control. Passing flatus. Her  admission labs revealed a creatinine of 0.5, ALT 19, AST 12. White  count 20, hemoglobin 12.5, platelets 076,893. Postpartum day #1  hemoglobin is 11. She is afebrile. Heart rate is 84-83. Blood  pressure 111-89 over 54-76. O2 sat 98% on room air. Lungs:  Clear to  auscultation. CV:  Regular rate and rhythm. Fundus is firm and two  fingerbreadths below the umbilicus. Perineum dry and intact. Extremities:  No clubbing, cyanosis. 1+ edema. No cords or erythema. ASSESSMENT:  The patient is doing well postpartum. Stable. PLAN:  Home with fever, bleeding, emboli, lifting, climbing, driving  precautions. She is to follow up at the office in 6 weeks. She  indicates understanding of all instructions.         Mar Doss MD    D: 2022 8:12:30       T: 2022 8:14:47     [email or web address]  Job#: [id number]     Doc#: [id number]    CC:

## 2023-10-25 ENCOUNTER — OFFICE VISIT (OUTPATIENT)
Dept: PRIMARY CARE | Facility: CLINIC | Age: 28
End: 2023-10-25
Payer: COMMERCIAL

## 2023-10-25 VITALS — SYSTOLIC BLOOD PRESSURE: 132 MMHG | DIASTOLIC BLOOD PRESSURE: 76 MMHG

## 2023-10-25 DIAGNOSIS — M25.522 CHRONIC PAIN OF LEFT ELBOW: Primary | ICD-10-CM

## 2023-10-25 DIAGNOSIS — G89.29 CHRONIC PAIN OF LEFT ELBOW: Primary | ICD-10-CM

## 2023-10-25 PROCEDURE — 99213 OFFICE O/P EST LOW 20 MIN: CPT | Performed by: FAMILY MEDICINE

## 2023-10-25 NOTE — PROGRESS NOTES
Subjective   Patient ID: Adrianne Garcia is a 27 y.o. female who presents for left elbow pain for 3 mos    HPI   left elbow pain with decreased rom but no weakness or paresthesia for 3 mos, no prior injury  Review of Systems    Objective   /76     Physical Exam  No distress, well groomed, No sclera icterus.  lungs: CTA b/l, heart: RRR, no LE  edema, normal pedal pulses, abd: soft, no tenderness, BS+, mild left elbow tenderness with decreased rom, mild effusion. No warmth.  normal strength and sensation at left arm, good balance  Assessment/Plan   Problem List Items Addressed This Visit             ICD-10-CM    Chronic pain of left elbow - Primary M25.522, G89.29     Cold compress. Check xr. Motrin prn         Relevant Orders    XR elbow left 3+ views

## 2023-11-04 ENCOUNTER — HOSPITAL ENCOUNTER (OUTPATIENT)
Dept: RADIOLOGY | Facility: HOSPITAL | Age: 28
Discharge: HOME | End: 2023-11-04
Payer: COMMERCIAL

## 2023-11-04 DIAGNOSIS — M25.522 CHRONIC PAIN OF LEFT ELBOW: ICD-10-CM

## 2023-11-04 DIAGNOSIS — G89.29 CHRONIC PAIN OF LEFT ELBOW: ICD-10-CM

## 2023-11-04 PROCEDURE — 73080 X-RAY EXAM OF ELBOW: CPT | Mod: LT,FY

## 2023-11-04 PROCEDURE — 73080 X-RAY EXAM OF ELBOW: CPT | Mod: LEFT SIDE | Performed by: RADIOLOGY

## 2023-11-10 ENCOUNTER — OFFICE VISIT (OUTPATIENT)
Dept: PRIMARY CARE | Facility: CLINIC | Age: 28
End: 2023-11-10
Payer: COMMERCIAL

## 2023-11-10 VITALS
BODY MASS INDEX: 37.62 KG/M2 | OXYGEN SATURATION: 97 % | DIASTOLIC BLOOD PRESSURE: 70 MMHG | SYSTOLIC BLOOD PRESSURE: 122 MMHG | WEIGHT: 209 LBS | RESPIRATION RATE: 16 BRPM | TEMPERATURE: 97.2 F | HEART RATE: 80 BPM

## 2023-11-10 DIAGNOSIS — M77.02 GOLFER'S ELBOW, LEFT: Primary | ICD-10-CM

## 2023-11-10 DIAGNOSIS — F33.2 MAJOR DEPRESSIVE DISORDER, RECURRENT SEVERE WITHOUT PSYCHOTIC FEATURES (MULTI): ICD-10-CM

## 2023-11-10 PROCEDURE — 99406 BEHAV CHNG SMOKING 3-10 MIN: CPT | Performed by: FAMILY MEDICINE

## 2023-11-10 PROCEDURE — 99213 OFFICE O/P EST LOW 20 MIN: CPT | Performed by: FAMILY MEDICINE

## 2023-11-10 RX ORDER — SERTRALINE HYDROCHLORIDE 100 MG/1
TABLET, FILM COATED ORAL
COMMUNITY
End: 2024-03-12 | Stop reason: ALTCHOICE

## 2023-11-10 RX ORDER — SERTRALINE HYDROCHLORIDE 50 MG/1
1 TABLET, FILM COATED ORAL DAILY
COMMUNITY
Start: 2021-03-05 | End: 2024-03-12 | Stop reason: ALTCHOICE

## 2023-11-10 NOTE — PATIENT INSTRUCTIONS
Elbow pain: Likely Golfers elbow, advised to reduce repetitive motion, try PT. Pt would like to avoid medication so advised otc medication like Capsicin cream ,Diclofenac gel , Tumeric. Reviewed xray   Tobacco cessation: spent 3-10 min discussing tobacco cessation, pt is not ready to quit

## 2023-11-10 NOTE — PROGRESS NOTES
Assessment/Plan   ASSESSMENT/PLAN:      Adrianne was seen today for new patient visit.  Diagnoses and all orders for this visit:  Golfer's elbow, left (Primary)  -     Referral to Physical Therapy; Future  Major depressive disorder, recurrent severe without psychotic features (CMS/HCC)       Patient Instructions   Elbow pain: Likely Golfers elbow, advised to reduce repetitive motion, try PT. Pt would like to avoid medication so advised otc medication like Capsicin cream ,Diclofenac gel , Tumeric. Reviewed xray   Tobacco cessation: spent 3-10 min discussing tobacco cessation, pt is not ready to quit       Zuri Isabel, DO     FUTURE DIRECTION:     Subjective   SUBJECTIVE:     Patient ID: Adrianne Garcia is a 27 y.o. femalefor the following:    Elbow pain   States that left elbow pops and hurts  Associated with numbness that radiates to hands and has limited ROM  Was seen by PCP   Ongoing for 2-3 months     Review of Systems    No Known Allergies      Current Outpatient Medications:     sertraline (Zoloft) 50 mg tablet, Take 1 tablet (50 mg) by mouth once daily., Disp: , Rfl:     sertraline (Zoloft) 100 mg tablet, TAKE ONE & ONE-HALF (1-1/2) TABLETS DAILY., Disp: , Rfl:      Patient Active Problem List   Diagnosis    Chronic pain of left elbow    Major depressive disorder, recurrent severe without psychotic features (CMS/HCC)       Social History     Socioeconomic History    Marital status:      Spouse name: Not on file    Number of children: Not on file    Years of education: Not on file    Highest education level: Not on file   Occupational History    Occupation: [employer]   Tobacco Use    Smoking status: Every Day     Packs/day: .25     Types: Cigarettes    Smokeless tobacco: Never   Substance and Sexual Activity    Alcohol use: Not on file    Drug use: Never    Sexual activity: Not on file   Other Topics Concern    Not on file   Social History Narrative    Not on file     Social Determinants of Health      Financial Resource Strain: Not on file   Food Insecurity: Not on file   Transportation Needs: Not on file   Physical Activity: Not on file   Stress: Not on file   Social Connections: Not on file   Intimate Partner Violence: Not on file   Housing Stability: Not on file       Objective   OBJECTIVE:     Vitals:    11/10/23 0831   BP: 122/70   Pulse: 80   Resp: 16   Temp: 36.2 °C (97.2 °F)   SpO2: 97%       Exam      Physical Exam  Constitutional:       Appearance: Normal appearance.   HENT:      Head: Normocephalic.   Pulmonary:      Effort: Pulmonary effort is normal.   Musculoskeletal:        Arms:       Cervical back: Normal range of motion.   Neurological:      Mental Status: She is alert.   Psychiatric:         Mood and Affect: Mood normal.

## 2024-03-12 ENCOUNTER — OFFICE VISIT (OUTPATIENT)
Dept: PRIMARY CARE | Facility: CLINIC | Age: 29
End: 2024-03-12
Payer: COMMERCIAL

## 2024-03-12 VITALS
HEART RATE: 89 BPM | WEIGHT: 206 LBS | TEMPERATURE: 97.6 F | OXYGEN SATURATION: 98 % | BODY MASS INDEX: 37.08 KG/M2 | SYSTOLIC BLOOD PRESSURE: 100 MMHG | DIASTOLIC BLOOD PRESSURE: 60 MMHG

## 2024-03-12 DIAGNOSIS — R82.90 ABNORMAL URINALYSIS: ICD-10-CM

## 2024-03-12 DIAGNOSIS — R39.15 URINARY URGENCY: ICD-10-CM

## 2024-03-12 DIAGNOSIS — F33.2 MAJOR DEPRESSIVE DISORDER, RECURRENT SEVERE WITHOUT PSYCHOTIC FEATURES (MULTI): Primary | ICD-10-CM

## 2024-03-12 DIAGNOSIS — F17.210 CIGARETTE NICOTINE DEPENDENCE WITHOUT COMPLICATION: ICD-10-CM

## 2024-03-12 PROBLEM — F41.8 ANXIETY ASSOCIATED WITH DEPRESSION: Status: ACTIVE | Noted: 2024-03-12

## 2024-03-12 PROBLEM — E55.9 VITAMIN D DEFICIENCY: Status: ACTIVE | Noted: 2024-03-12

## 2024-03-12 LAB
POC APPEARANCE, URINE: CLEAR
POC BILIRUBIN, URINE: NEGATIVE
POC BLOOD, URINE: NEGATIVE
POC COLOR, URINE: YELLOW
POC GLUCOSE, URINE: NEGATIVE MG/DL
POC KETONES, URINE: NEGATIVE MG/DL
POC LEUKOCYTES, URINE: ABNORMAL
POC NITRITE,URINE: NEGATIVE
POC PH, URINE: 6 PH
POC PROTEIN, URINE: NEGATIVE MG/DL
POC SPECIFIC GRAVITY, URINE: 1.02
POC UROBILINOGEN, URINE: 0.2 EU/DL

## 2024-03-12 PROCEDURE — 81003 URINALYSIS AUTO W/O SCOPE: CPT | Performed by: FAMILY MEDICINE

## 2024-03-12 PROCEDURE — 87086 URINE CULTURE/COLONY COUNT: CPT

## 2024-03-12 PROCEDURE — 99406 BEHAV CHNG SMOKING 3-10 MIN: CPT | Performed by: FAMILY MEDICINE

## 2024-03-12 PROCEDURE — 99214 OFFICE O/P EST MOD 30 MIN: CPT | Performed by: FAMILY MEDICINE

## 2024-03-12 RX ORDER — NITROFURANTOIN 25; 75 MG/1; MG/1
100 CAPSULE ORAL 2 TIMES DAILY
Qty: 14 CAPSULE | Refills: 0 | Status: SHIPPED | OUTPATIENT
Start: 2024-03-12 | End: 2024-03-19

## 2024-03-12 RX ORDER — SERTRALINE HYDROCHLORIDE 50 MG/1
TABLET, FILM COATED ORAL
Qty: 60 TABLET | Refills: 0 | Status: SHIPPED | OUTPATIENT
Start: 2024-03-12

## 2024-03-12 NOTE — PATIENT INSTRUCTIONS
1. Major depressive disorder, recurrent severe without psychotic features (CMS/HCC)  Patient requesting to restart sertraline, will start at 50 mg and gave directions to titrate up, will follow-up in 6 weeks  - sertraline (Zoloft) 50 mg tablet; Take one tablet daily for 1 week then increase to 2 tablets daily  Dispense: 60 tablet; Refill: 0    2. Urinary urgency  Will send urine for culture and empirically treat with Macrobid, AE's discussed with patient  - POCT UA Automated manually resulted  - Urine Culture; Future    3. Abnormal urinalysis    - nitrofurantoin, macrocrystal-monohydrate, (Macrobid) 100 mg capsule; Take 1 capsule (100 mg) by mouth 2 times a day for 7 days.  Dispense: 14 capsule; Refill: 0    4. Cigarette nicotine dependence without complication  Tobacco cessation: spent 3-10 min discussing tobacco cessation, pt is not ready to quit

## 2024-03-12 NOTE — PROGRESS NOTES
Assessment     ASSESSMENT/PLAN:      Patient Instructions   1. Major depressive disorder, recurrent severe without psychotic features (CMS/HCC)  Patient requesting to restart sertraline, will start at 50 mg and gave directions to titrate up, will follow-up in 6 weeks  - sertraline (Zoloft) 50 mg tablet; Take one tablet daily for 1 week then increase to 2 tablets daily  Dispense: 60 tablet; Refill: 0    2. Urinary urgency  Will send urine for culture and empirically treat with Macrobid, AE's discussed with patient  - POCT UA Automated manually resulted  - Urine Culture; Future    3. Abnormal urinalysis    - nitrofurantoin, macrocrystal-monohydrate, (Macrobid) 100 mg capsule; Take 1 capsule (100 mg) by mouth 2 times a day for 7 days.  Dispense: 14 capsule; Refill: 0    4. Cigarette nicotine dependence without complication  Tobacco cessation: spent 3-10 min discussing tobacco cessation, pt is not ready to quit              Signed by: Zuri Isabel DO       FUTURE DIRECTION:   []    Subjective   SUBJECTIVE:     HPI : Patient is a 28 y.o. female who presents today for the following:     Anxiety  Has been feeling more stressed out recently, has been off of sertraline over the past 2 years  States that she was taking sertraline 150 mg however felt like that was a increased dose  Patient states she has also been feeling depressed but denies suicidal or homicidal ideation  Of note, patient does have history of hospitalization current admission for major depression    Urinary changes   Mentions urgency and dysuir for the past week   Denies vaginal discharge, fever or chills     Review of Systems    Past Medical History:   Diagnosis Date    Other conditions influencing health status     No significant past medical history    Personal history of other diseases of the nervous system and sense organs     History of migraine    Personal history of other mental and behavioral disorders     History of depression     Personal history of other specified conditions 03/05/2021    History of chronic fatigue    Personal history of other specified conditions 11/18/2021    History of dysuria    Psychophysiologic insomnia 03/05/2021    Chronic insomnia        Past Surgical History:   Procedure Laterality Date    OTHER SURGICAL HISTORY  03/05/2021    No history of surgery        Current Outpatient Medications   Medication Instructions    nitrofurantoin, macrocrystal-monohydrate, (Macrobid) 100 mg capsule 100 mg, oral, 2 times daily    sertraline (Zoloft) 50 mg tablet Take one tablet daily for 1 week then increase to 2 tablets daily        No Known Allergies     Social History     Socioeconomic History    Marital status:      Spouse name: Not on file    Number of children: Not on file    Years of education: Not on file    Highest education level: Not on file   Occupational History    Occupation: [employer]   Tobacco Use    Smoking status: Every Day     Packs/day: .25     Types: Cigarettes    Smokeless tobacco: Never   Substance and Sexual Activity    Alcohol use: Not on file    Drug use: Never    Sexual activity: Not on file   Other Topics Concern    Not on file   Social History Narrative    Not on file     Social Determinants of Health     Financial Resource Strain: Not on file   Food Insecurity: Not on file   Transportation Needs: Not on file   Physical Activity: Not on file   Stress: Not on file   Social Connections: Not on file   Intimate Partner Violence: Not on file   Housing Stability: Not on file        Family History   Problem Relation Name Age of Onset    Diabetes Mother      Diabetes type II Father      Thyroid cancer Maternal Grandmother          Objective     OBJECTIVE:     Vitals:    03/12/24 0847   BP: 100/60   Pulse: 89   Temp: 36.4 °C (97.6 °F)   SpO2: 98%   Weight: 93.4 kg (206 lb)        Physical Exam  Constitutional:       Appearance: Normal appearance.   HENT:      Head: Normocephalic and atraumatic.      Nose: Nose  normal.      Mouth/Throat:      Mouth: Mucous membranes are moist.   Eyes:      Pupils: Pupils are equal, round, and reactive to light.   Cardiovascular:      Rate and Rhythm: Normal rate and regular rhythm.      Pulses: Normal pulses.      Heart sounds: No murmur heard.  Pulmonary:      Effort: Pulmonary effort is normal.      Breath sounds: Normal breath sounds.   Abdominal:      Tenderness: There is abdominal tenderness. There is no right CVA tenderness or left CVA tenderness.   Musculoskeletal:         General: Normal range of motion.      Cervical back: Normal range of motion.   Skin:     General: Skin is warm and dry.   Neurological:      Mental Status: She is alert.   Psychiatric:         Mood and Affect: Mood normal.

## 2024-03-14 ENCOUNTER — TELEPHONE (OUTPATIENT)
Dept: PRIMARY CARE | Facility: CLINIC | Age: 29
End: 2024-03-14
Payer: COMMERCIAL

## 2024-03-14 LAB — BACTERIA UR CULT: NORMAL

## 2024-03-14 NOTE — TELEPHONE ENCOUNTER
----- Message from Zuri Isabel DO sent at 3/14/2024  7:57 AM EDT -----  Please call patient and let then know the following:  Pt can discontinue abx because there is no bacteria growth in urine

## 2024-04-23 ENCOUNTER — APPOINTMENT (OUTPATIENT)
Dept: PRIMARY CARE | Facility: CLINIC | Age: 29
End: 2024-04-23
Payer: COMMERCIAL

## 2024-05-14 ENCOUNTER — TELEPHONE (OUTPATIENT)
Dept: ADMINISTRATIVE | Age: 29
End: 2024-05-14

## 2024-05-14 NOTE — TELEPHONE ENCOUNTER
Pt scheduled an appt on 5/20 with Dr. Hayes.  She said she last saw her a year ago at previous location.  Pt is being seen for what feels like a soft ball in vaginal opening.

## 2024-05-17 NOTE — PROGRESS NOTES
depression                       No agitation                       No anxiety                                    Body mass index is 38.11 kg/m².    SKIN: Within normal limits    HEENT: THYROID no masses , no tenderness, no enlargement                 NECK: Symmetrical ,no masses, no crepitus, trachea midline, no adenopathy    LUNGS: Symmetric diaphragmatic movement                 No intercostal retraction                  No use of accessory muscles                 Clear to auscultation                 No rhonchi                 No wheezes     HEART: Regular rate                Regular rhythm                No murmur                No gallop                No rubs    ABDOMEN: Soft, nondistended                      Nontender                      No masses                      No organomegaly                      Normal bowel sounds                      No HERNIA    LYMPHATICS: Neck nodes negative.  Axillary nodes negative.    MUSCULAR AND NEURO:                       Digits and nails without clubbing cyanosis or edema                       RIGHT ARM No tenderness or masses or defect.                                           Full range of motion, with no pain or contracture, dislocation or laxity                                           Normal tone;                                           No atrophy                                           No abnormal movements                                                No deficit to touch                       LEFT ARM : No tenderness or masses or defect                                          Full range of motion, with no pain or contracture, dislocation or laxity;                                          Normal tone                                           No atrophy                                          No abnormal movements                                           No deficit to touch      SKIN: Skin and subcu of chest, breast, arms with: No lesions

## 2024-05-20 ENCOUNTER — OFFICE VISIT (OUTPATIENT)
Age: 29
End: 2024-05-20
Payer: COMMERCIAL

## 2024-05-20 VITALS
HEART RATE: 95 BPM | BODY MASS INDEX: 38.08 KG/M2 | HEIGHT: 61 IN | WEIGHT: 201.7 LBS | SYSTOLIC BLOOD PRESSURE: 107 MMHG | OXYGEN SATURATION: 98 % | DIASTOLIC BLOOD PRESSURE: 78 MMHG

## 2024-05-20 DIAGNOSIS — Z12.4 CERVICAL CANCER SCREENING: Primary | ICD-10-CM

## 2024-05-20 PROCEDURE — 99395 PREV VISIT EST AGE 18-39: CPT | Performed by: LEGAL MEDICINE

## 2024-05-20 ASSESSMENT — ENCOUNTER SYMPTOMS
CONSTIPATION: 0
ABDOMINAL DISTENTION: 0
RECTAL PAIN: 0
VOMITING: 0
DIARRHEA: 0
ABDOMINAL PAIN: 0
NAUSEA: 0
BLOOD IN STOOL: 0

## 2024-05-24 LAB
GYNECOLOGY CYTOLOGY REPORT: NORMAL
HPV SAMPLE: NORMAL
HPV SOURCE: NORMAL
HPV, GENOTYPE 16: NOT DETECTED
HPV, GENOTYPE 18: NOT DETECTED
HPV, HIGH RISK OTHER: NOT DETECTED
HPV, INTERPRETATION: NORMAL

## 2024-06-13 ENCOUNTER — APPOINTMENT (OUTPATIENT)
Dept: PRIMARY CARE | Facility: CLINIC | Age: 29
End: 2024-06-13
Payer: COMMERCIAL

## 2024-06-13 VITALS
WEIGHT: 201 LBS | SYSTOLIC BLOOD PRESSURE: 110 MMHG | BODY MASS INDEX: 36.18 KG/M2 | TEMPERATURE: 98.1 F | OXYGEN SATURATION: 98 % | HEART RATE: 77 BPM | DIASTOLIC BLOOD PRESSURE: 72 MMHG

## 2024-06-13 DIAGNOSIS — L30.1 DYSHIDROTIC ECZEMA: Primary | ICD-10-CM

## 2024-06-13 PROCEDURE — 99213 OFFICE O/P EST LOW 20 MIN: CPT | Performed by: FAMILY MEDICINE

## 2024-06-13 NOTE — PROGRESS NOTES
Assessment/Plan   ASSESSMENT/PLAN:      Patient Instructions   Rash: Likely eczema, offered steroid topical ointment however patient declined and would rather do oral treatment.  Will refer to dermatology    Zuri Isabel,      FUTURE DIRECTION:     Subjective   SUBJECTIVE:     Patient ID: Adrianne Garcia is a 28 y.o. femalefor the following:    Rash  Noticed on planter surface of hands and feet  Ongoing for 14 years   Tried otc creams without improvement   Was seen by PCP in the past and tried prescription brain which did not help    Review of Systems    No Known Allergies      Current Outpatient Medications:     sertraline (Zoloft) 50 mg tablet, Take one tablet daily for 1 week then increase to 2 tablets daily (Patient not taking: Reported on 6/13/2024), Disp: 60 tablet, Rfl: 0     Patient Active Problem List   Diagnosis    Chronic pain of left elbow    Major depressive disorder, recurrent severe without psychotic features (Multi)    Vitamin D deficiency    Anxiety associated with depression    Cigarette nicotine dependence without complication       Social History     Socioeconomic History    Marital status:      Spouse name: Not on file    Number of children: Not on file    Years of education: Not on file    Highest education level: Not on file   Occupational History    Occupation: [employer]   Tobacco Use    Smoking status: Every Day     Current packs/day: 0.25     Types: Cigarettes    Smokeless tobacco: Never   Substance and Sexual Activity    Alcohol use: Not on file    Drug use: Never    Sexual activity: Not on file   Other Topics Concern    Not on file   Social History Narrative    Not on file     Social Determinants of Health     Financial Resource Strain: Not on file   Food Insecurity: Not on file   Transportation Needs: Not on file   Physical Activity: Not on file   Stress: Not on file   Social Connections: Not on file   Intimate Partner Violence: Not on file   Housing Stability: Not on file        Objective   OBJECTIVE:     Vitals:    06/13/24 1206   BP: 110/72   Pulse: 77   Temp: 36.7 °C (98.1 °F)   SpO2: 98%       Exam      Physical Exam  Constitutional:       Appearance: Normal appearance.   HENT:      Head: Normocephalic.   Pulmonary:      Effort: Pulmonary effort is normal.   Musculoskeletal:      Cervical back: Normal range of motion.   Skin:     Comments: Scaly rash on palmar surfaces of hands and plantar surfaces of feet with mild erythema surrounding and small pustules   Neurological:      Mental Status: She is alert.   Psychiatric:         Mood and Affect: Mood normal.

## 2024-08-07 ENCOUNTER — APPOINTMENT (OUTPATIENT)
Dept: DERMATOLOGY | Facility: CLINIC | Age: 29
End: 2024-08-07
Payer: COMMERCIAL

## 2024-08-07 DIAGNOSIS — L85.3 XEROSIS CUTIS: ICD-10-CM

## 2024-08-07 DIAGNOSIS — R21 RASH AND OTHER NONSPECIFIC SKIN ERUPTION: Primary | ICD-10-CM

## 2024-08-07 DIAGNOSIS — L70.0 ACNE VULGARIS: ICD-10-CM

## 2024-08-07 PROCEDURE — 11104 PUNCH BX SKIN SINGLE LESION: CPT | Performed by: DERMATOLOGY

## 2024-08-07 PROCEDURE — 99204 OFFICE O/P NEW MOD 45 MIN: CPT | Performed by: DERMATOLOGY

## 2024-08-07 RX ORDER — BENZOYL PEROXIDE 50 MG/ML
1 LIQUID TOPICAL DAILY
Qty: 227 G | Refills: 11 | Status: SHIPPED | OUTPATIENT
Start: 2024-08-07

## 2024-08-07 ASSESSMENT — DERMATOLOGY PATIENT ASSESSMENT
ARE YOU AN ORGAN TRANSPLANT RECIPIENT: NO
DO YOU HAVE IRREGULAR MENSTRUAL CYCLES: NO
ARE YOU ON BIRTH CONTROL: NO
DO YOU USE A TANNING BED: NO
DO YOU HAVE ANY NEW OR CHANGING LESIONS: NO
HAVE YOU HAD OR DO YOU HAVE VASCULAR DISEASE: NO
HAVE YOU HAD OR DO YOU HAVE A STAPH INFECTION: NO
WHERE ARE THESE NEW OR CHANGING LESIONS LOCATED: HAND AND FEET
ARE YOU TRYING TO GET PREGNANT: NO
DO YOU USE SUNSCREEN: OCCASIONALLY

## 2024-08-07 ASSESSMENT — DERMATOLOGY QUALITY OF LIFE (QOL) ASSESSMENT
ARE THERE EXCLUSIONS OR EXCEPTIONS FOR THE QUALITY OF LIFE ASSESSMENT: NO
WHAT SINGLE SKIN CONDITION LISTED BELOW IS THE PATIENT ANSWERING THE QUALITY-OF-LIFE ASSESSMENT QUESTIONS ABOUT: DERMATITIS
RATE HOW BOTHERED YOU ARE BY SYMPTOMS OF YOUR SKIN PROBLEM (EG, ITCHING, STINGING BURNING, HURTING OR SKIN IRRITATION): 6 - ALWAYS BOTHERED
RATE HOW BOTHERED YOU ARE BY SYMPTOMS OF YOUR SKIN PROBLEM (EG, ITCHING, STINGING BURNING, HURTING OR SKIN IRRITATION): 0 - NEVER BOTHERED
RATE HOW BOTHERED YOU ARE BY EFFECTS OF YOUR SKIN PROBLEMS ON YOUR ACTIVITIES (EG, GOING OUT, ACCOMPLISHING WHAT YOU WANT, WORK ACTIVITIES OR YOUR RELATIONSHIPS WITH OTHERS): 6 - ALWAYS BOTHERED
RATE HOW BOTHERED YOU ARE BY EFFECTS OF YOUR SKIN PROBLEMS ON YOUR ACTIVITIES (EG, GOING OUT, ACCOMPLISHING WHAT YOU WANT, WORK ACTIVITIES OR YOUR RELATIONSHIPS WITH OTHERS): 0 - NEVER BOTHERED
RATE HOW BOTHERED YOU ARE BY SYMPTOMS OF YOUR SKIN PROBLEM (EG, ITCHING, STINGING BURNING, HURTING OR SKIN IRRITATION): 6 - ALWAYS BOTHERED
RATE HOW EMOTIONALLY BOTHERED YOU ARE BY YOUR SKIN PROBLEM (FOR EXAMPLE, WORRY, EMBARRASSMENT, FRUSTRATION): 6 - ALWAYS BOTHERED
RATE HOW EMOTIONALLY BOTHERED YOU ARE BY YOUR SKIN PROBLEM (FOR EXAMPLE, WORRY, EMBARRASSMENT, FRUSTRATION): 6 - ALWAYS BOTHERED
RATE HOW EMOTIONALLY BOTHERED YOU ARE BY YOUR SKIN PROBLEM (FOR EXAMPLE, WORRY, EMBARRASSMENT, FRUSTRATION): 0 - NEVER BOTHERED
RATE HOW BOTHERED YOU ARE BY EFFECTS OF YOUR SKIN PROBLEMS ON YOUR ACTIVITIES (EG, GOING OUT, ACCOMPLISHING WHAT YOU WANT, WORK ACTIVITIES OR YOUR RELATIONSHIPS WITH OTHERS): 6 - ALWAYS BOTHERED
WHAT SINGLE SKIN CONDITION LISTED BELOW IS THE PATIENT ANSWERING THE QUALITY-OF-LIFE ASSESSMENT QUESTIONS ABOUT: NONE OF THE ABOVE
WHAT SINGLE SKIN CONDITION LISTED BELOW IS THE PATIENT ANSWERING THE QUALITY-OF-LIFE ASSESSMENT QUESTIONS ABOUT: NONE OF THE ABOVE
DATE THE QUALITY-OF-LIFE ASSESSMENT WAS COMPLETED: 67059

## 2024-08-07 ASSESSMENT — ITCH NUMERIC RATING SCALE: HOW SEVERE IS YOUR ITCHING?: 0

## 2024-08-07 NOTE — PROGRESS NOTES
Subjective     Adrianne Garcia is a 28 y.o. female who presents for the following: Rash.  The patient reports she has been developing an intermittent rash with red, scaly, itchy, bumps and sometimes blisters on her hands and feet for the past 15 years.  She states she has used multiple over-the-counter anti-fungal medications, but the rash does not resolve.  She notes the worst area currently is her left foot.  She denies other areas of involvement.  She also notes recent acne breakouts on her face.      Review of Systems:  No other skin or systemic complaints other than what is documented elsewhere in the note.    The following portions of the chart were reviewed this encounter and updated as appropriate:       Skin Cancer History  No skin cancer on file.    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    Current Medications / CAM's:    Current Outpatient Medications:     benzoyl peroxide 5 % external wash, Apply 1 Application topically once daily. In the morning, Disp: 227 g, Rfl: 11    sertraline (Zoloft) 50 mg tablet, Take one tablet daily for 1 week then increase to 2 tablets daily (Patient not taking: Reported on 6/13/2024), Disp: 60 tablet, Rfl: 0     Objective   Well appearing patient in no apparent distress; mood and affect are within normal limits.    A skin examination was performed including: Face, neck, and extremities. All findings within normal limits unless otherwise noted below.    Assessment/Plan   1. Rash and other nonspecific skin eruption  Left Medial Foot  On her left foot, there is a large erythematous, scaly, possibly annular plaque in a moccasin type  distribution with several erythematous, scaly papules, some with collarettes of scale suggestive of resolving vesicles, along her medial foot as well as webspace maceration.  There are no other areas of involvement noted on exam today.          Erythematous, scaly, possibly annular plaque - left foot.  The clinical differential diagnosis for these lesions includes possibly eczema, including likely dyshidrotic eczema, versus tinea pedis versus psoriasis.  The nature of each of these conditions and management options, including possible biopsy for further diagnostic evaluation, were discussed extensively with the patient today.  After discussing the risks and benefits of various management options, the patient expressed understanding and wishes to undergo biopsy today.  Thus, at this time, I recommend punch biopsy of a representative lesion on the patient's left medial foot in the office today for further diagnostic evaluation.  We agreed we would defer treatment for this condition until biopsy results are available.  The patient expressed understanding, is in agreement with this plan, and wishes to proceed with the biopsy today.    Lesion biopsy - Left Medial Foot  Type of biopsy: punch    Informed consent: discussed and consent obtained    Timeout: patient name, date of birth, surgical site, and procedure verified    Procedure prep:  Patient was prepped and draped  Anesthesia: the lesion was anesthetized in a standard fashion    Anesthetic:  1% lidocaine w/ epinephrine 1-100,000 local infiltration  Punch size:  4 mm  Suture size:  4-0  Suture type: Prolene (polypropylene)    Suture removal (days):  14  Hemostasis achieved with: suture    Outcome: patient tolerated procedure well    Post-procedure details: sterile dressing applied and wound care instructions given    Dressing type: bandage and petrolatum      Staff Communication: Dermatology Local Anesthesia: 1 % Lidocaine / Epinephrine - Amount:0.5ml - Left Medial  Foot    Specimen 1 - Dermatopathology- DERM LAB  Differential Diagnosis: dyshidrotic eczema v Tinea pedis  Check Margins Yes/No?:    Comments:    Dermpath Lab: Routine Histopathology (formalin-fixed tissue)    2. Acne vulgaris  Head - Anterior (Face)  Scattered on the patient's face, there are several open and closed comedones; a few erythematous, inflammatory papules; and several pink to hyperpigmented macules consistent with post-inflammatory hyperpigmentation    Acne Vulgaris - face; mild; mixed comedonal and inflammatory types.  The nature of this condition and treatment options were discussed extensively with the patient today.  At this time, I recommend topical therapy with Benzoyl Peroxide 5% creamy wash, which she was instructed to use to wash the affected areas of her face once or twice daily.  The risks, benefits, and side effects of this medication, including the redness, dryness, and irritation expected with BP use, were discussed.  I also informed the patient it will likely take at least 2-3 months to notice any significant improvement with the treatment regimen outlined above, and she was instructed to discontinue use of this medication should she become or attempt to become pregnant at any time in the future.  She expressed understanding and is in agreement with this plan.    benzoyl peroxide 5 % external wash - Head - Anterior (Face)  Apply 1 Application topically once daily. In the morning    3. Xerosis cutis  Diffuse generalized dry, scaly skin.    Xerosis.  I emphasized the importance of dry, sensitive skin care, including the use of a mild soap, such as Dove, and frequent and aggressive moisturization, at least twice daily and immediately following showers or baths, with recommended over-the-counter moisturizing creams, such as Eucerin, Cetaphil, Cerave, or Aveeno, or Vaseline or Aquaphor ointments.

## 2024-08-20 LAB
LABORATORY COMMENT REPORT: NORMAL
PATH REPORT.FINAL DX SPEC: NORMAL
PATH REPORT.GROSS SPEC: NORMAL
PATH REPORT.MICROSCOPIC SPEC OTHER STN: NORMAL
PATH REPORT.RELEVANT HX SPEC: NORMAL
PATH REPORT.TOTAL CANCER: NORMAL

## 2024-08-21 ENCOUNTER — APPOINTMENT (OUTPATIENT)
Dept: DERMATOLOGY | Facility: CLINIC | Age: 29
End: 2024-08-21
Payer: COMMERCIAL

## 2024-08-21 DIAGNOSIS — L30.1 ECZEMA, DYSHIDROTIC: Primary | ICD-10-CM

## 2024-08-21 DIAGNOSIS — L85.3 XEROSIS CUTIS: ICD-10-CM

## 2024-08-21 DIAGNOSIS — L30.9 HAND ECZEMA: ICD-10-CM

## 2024-08-21 PROCEDURE — 99214 OFFICE O/P EST MOD 30 MIN: CPT | Performed by: DERMATOLOGY

## 2024-08-21 RX ORDER — TRIAMCINOLONE ACETONIDE 1 MG/G
CREAM TOPICAL
Qty: 80 G | Refills: 2 | Status: SHIPPED | OUTPATIENT
Start: 2024-08-21

## 2024-08-24 ENCOUNTER — HOSPITAL ENCOUNTER (EMERGENCY)
Facility: HOSPITAL | Age: 29
End: 2024-08-24
Payer: COMMERCIAL

## 2024-08-25 NOTE — PROGRESS NOTES
"Subjective     Adrianne Garcia is a 28 y.o. female who presents for the following: Follow-up.  The patient reports she has been developing an intermittent rash with red, scaly, itchy, bumps and sometimes blisters on her hands and feet for over 15 years.  She states she has used multiple over-the-counter anti-fungal medications, but the rash does not resolve.  She notes the worst area currently is her left foot.    Punch biopsy of a representative lesion on her left medial foot performed at her last visit in our office on 8/7/24 revealed \"spongiosis with microvesiculation and keratinocyte necrosis,\" for which the microscopic differential diagnosis includes dyshidrotic eczema (pomphylox), however given the pattern of epidermal degeneration, vesiculation, and possible eosinophilic inclusions, a viral infection, such as may be seen in orf or milker's nodules within the microscopic differential diagnosis.      Today, the patient states the biopsy site healed well.  She notes the areas on her left foot are still red, scaly, and itchy.  She also notes red, dry, itchy areas on her hands since her last visit.  She denies any other new, changing, or concerning skin lesions; no bleeding, itching, or burning lesions.      Review of Systems:  No other skin or systemic complaints other than what is documented elsewhere in the note.    The following portions of the chart were reviewed this encounter and updated as appropriate:       Skin Cancer History  No skin cancer on file.    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    Current Medications / CAM's:    Current Outpatient Medications:     benzoyl peroxide 5 % external wash, Apply 1 Application topically once daily. In the morning, Disp: 227 g, Rfl: 11    sertraline (Zoloft) 50 mg tablet, Take one tablet daily for 1 week then increase to 2 tablets daily (Patient not taking: Reported on 6/13/2024), Disp: 60 tablet, Rfl: 0    triamcinolone (Kenalog) 0.1 % cream, Apply twice daily to affected areas of foot (avoid face, groin, body folds) for 2 weeks, then taper to twice daily on weekends only; repeat every 6-8 weeks as needed for flares, Disp: 80 g, Rfl: 2     Objective   Well appearing patient in no apparent distress; mood and affect are within normal limits.    A skin examination was performed including: Face, neck, and bilateral hands and feet. All findings within normal limits unless otherwise noted below.    Assessment/Plan   1. Eczema, dyshidrotic  Left Foot - Anterior  On her left foot, there is a large erythematous, scaly, possibly annular plaque in a moccasin type distribution with several erythematous, scaly papules, some with collarettes of scale suggestive of resolving vesicles, along her medial foot as well as webspace maceration.  There are no other areas of involvement noted on exam today.    Dyshidrotic Eczema - left foot; biopsy-proven diagnosis.  The potentially chronic and intermittently flaring nature of this condition and treatment options were discussed extensively with the patient today.  At this time, I recommend topical steroid therapy with Triamcinolone 0.1% cream, which the patient was instructed to apply twice daily to the affected areas of the left foot (avoid face, groin, body folds) for the next 2 weeks, followed by taper to twice daily on weekends only for persistent areas; the patient may repeat treatment in a 2-week burst-and-taper fashion every 6-8 weeks as needed for future flares.  I also  emphasized the importance of dry, sensitive skin care, including the use of a mild soap, such as Dove, and frequent and aggressive moisturization, at least twice daily and immediately following showers or baths, with recommended over-the-counter moisturizing creams, such as Eucerin, Cetaphil, Cerave, or Aveeno, or Vaseline or Aquaphor ointments.  The risks, benefits, and side effects of this medication, including possible skin atrophy with overuse of topical steroids, were discussed.  I also strongly encouraged smoking cessation, as she was informed cigarette smoking is likely exacerbating this condition.  The patient expressed understanding and is in agreement with this plan.  Of note, her suture was removed in the office today as well.    triamcinolone (Kenalog) 0.1 % cream - Left Foot - Anterior  Apply twice daily to affected areas of foot (avoid face, groin, body folds) for 2 weeks, then taper to twice daily on weekends only; repeat every 6-8 weeks as needed for flares    2. Hand eczema  Right Hand - Anterior  On the patient's bilateral hands, there are similar-appearing ill-defined erythematous, scaly, slightly lichenified, thin plaques with superficial fissures    Hand Dermatitis -bilateral hands.  The potentially chronic and intermittently flaring nature of this condition and treatment options were discussed extensively with the patient today.  At this time, I recommend topical steroid therapy with Triamcinolone 0.1% cream, which the patient was instructed to apply twice daily to the affected areas of her hands (avoid face, groin, body folds) for the next 2 weeks, followed by taper to twice daily on weekends only for persistent areas; the patient may repeat treatment in a 2-week burst-and-taper fashion every 6-8 weeks as needed for future flares.  I also emphasized the importance of dry, sensitive skin care and good hand hygiene, including minimizing the frequency and duration of hand-washing as much as is  safely possible, given the current coronavirus pandemic; using a lukewarm, but not hot water and a mild soap, such as Dove; and frequent and aggressive moisturization, at least twice daily and immediately following hand-washing, with recommended over-the-counter moisturizing creams, such as Eucerin, Cetaphil, Cerave, or Aveeno; Vaseline or Aquaphor ointments; or Neutrogena Norwegian Formula Hand Cream.  The risks, benefits, and side effects of this medication, including possible skin atrophy with overuse of topical steroids, were discussed.  The patient expressed understanding and is in agreement with this plan.    3. Xerosis cutis  Diffuse generalized dry, scaly skin.    Xerosis.  I emphasized the importance of dry, sensitive skin care, including the use of a mild soap, such as Dove, and frequent and aggressive moisturization, at least twice daily and immediately following showers or baths, with recommended over-the-counter moisturizing creams, such as Eucerin, Cetaphil, Cerave, or Aveeno, or Vaseline or Aquaphor ointments.  The patient was also provided an informational hand-out today containing further details on dry skin care.

## 2025-02-02 ENCOUNTER — OFFICE VISIT (OUTPATIENT)
Dept: URGENT CARE | Age: 30
End: 2025-02-02
Payer: COMMERCIAL

## 2025-02-02 VITALS
HEART RATE: 110 BPM | RESPIRATION RATE: 16 BRPM | DIASTOLIC BLOOD PRESSURE: 76 MMHG | TEMPERATURE: 98.1 F | OXYGEN SATURATION: 95 % | SYSTOLIC BLOOD PRESSURE: 115 MMHG

## 2025-02-02 DIAGNOSIS — J34.89 RHINORRHEA: ICD-10-CM

## 2025-02-02 DIAGNOSIS — R05.1 ACUTE COUGH: ICD-10-CM

## 2025-02-02 DIAGNOSIS — J10.1 INFLUENZA A: Primary | ICD-10-CM

## 2025-02-02 LAB
POC RAPID INFLUENZA A: POSITIVE
POC RAPID INFLUENZA B: NEGATIVE

## 2025-02-02 ASSESSMENT — ENCOUNTER SYMPTOMS
FLU SYMPTOMS: 1
CARDIOVASCULAR NEGATIVE: 1
CONSTITUTIONAL NEGATIVE: 1
COUGH: 1

## 2025-02-02 NOTE — PROGRESS NOTES
Subjective   Patient ID: Adrianne Garcia is a 29 y.o. female. They present today with a chief complaint of Flu Symptoms (Pt c/o cough and rhinorrhea x3 days, +influenza in home).    History of Present Illness  29-year-old female presents to clinic today with complaints of flulike symptoms.  Patient states she has had cough congestion for the last 3 to 4 days.  Patient states overall she has improved.  She did test positive for the flu and is requesting a work note.      Flu Symptoms  Presenting symptoms: cough    Associated symptoms: nasal congestion        Past Medical History  Allergies as of 02/02/2025    (No Known Allergies)       (Not in a hospital admission)       Past Medical History:   Diagnosis Date    Other conditions influencing health status     No significant past medical history    Personal history of other diseases of the nervous system and sense organs     History of migraine    Personal history of other mental and behavioral disorders     History of depression    Personal history of other specified conditions 03/05/2021    History of chronic fatigue    Personal history of other specified conditions 11/18/2021    History of dysuria    Psychophysiologic insomnia 03/05/2021    Chronic insomnia       Past Surgical History:   Procedure Laterality Date    OTHER SURGICAL HISTORY  03/05/2021    No history of surgery        reports that she has been smoking cigarettes. She has never used smokeless tobacco. She reports that she does not use drugs.    Review of Systems  Review of Systems   Constitutional: Negative.    HENT:  Positive for congestion.    Respiratory:  Positive for cough.    Cardiovascular: Negative.                                   Objective    Vitals:    02/02/25 0853   BP: 115/76   Pulse: 110   Resp: 16   Temp: 36.7 °C (98.1 °F)   SpO2: 95%     No LMP recorded.    Physical Exam  Constitutional:       General: She is not in acute distress.     Appearance: Normal appearance.   Cardiovascular:       Rate and Rhythm: Normal rate and regular rhythm.      Heart sounds: Normal heart sounds.   Pulmonary:      Effort: Pulmonary effort is normal.      Breath sounds: Normal breath sounds.   Neurological:      Mental Status: She is alert.         Procedures    Point of Care Test & Imaging Results from this visit  Results for orders placed or performed in visit on 02/02/25   POCT Influenza A/B manually resulted   Result Value Ref Range    POC Rapid Influenza A Positive (A) Negative    POC Rapid Influenza B Negative Negative      No results found.    Diagnostic study results (if any) were reviewed by Memo Brunner PA-C.    Assessment/Plan   Allergies, medications, history, and pertinent labs/EKGs/Imaging reviewed by Memo Brunner PA-C.     Medical Decision Making  Patient pleasant cooperative on examination.    Cardiopulmonary exam within normal limits.  Vital signs stable.    Orders and Diagnoses  Diagnoses and all orders for this visit:  Acute cough  -     POCT Influenza A/B manually resulted  Rhinorrhea  -     POCT Influenza A/B manually resulted      Medical Admin Record      Patient disposition: Home    Electronically signed by Memo Brunner PA-C  10:28 AM

## 2025-10-06 ENCOUNTER — APPOINTMENT (OUTPATIENT)
Dept: PRIMARY CARE | Facility: CLINIC | Age: 30
End: 2025-10-06
Payer: COMMERCIAL

## 2025-10-07 ENCOUNTER — APPOINTMENT (OUTPATIENT)
Dept: OBSTETRICS AND GYNECOLOGY | Facility: CLINIC | Age: 30
End: 2025-10-07
Payer: COMMERCIAL